# Patient Record
Sex: MALE | Race: BLACK OR AFRICAN AMERICAN | NOT HISPANIC OR LATINO | Employment: OTHER | URBAN - METROPOLITAN AREA
[De-identification: names, ages, dates, MRNs, and addresses within clinical notes are randomized per-mention and may not be internally consistent; named-entity substitution may affect disease eponyms.]

---

## 2023-10-03 DIAGNOSIS — H40.9 GLAUCOMA, UNSPECIFIED GLAUCOMA TYPE, UNSPECIFIED LATERALITY: Primary | ICD-10-CM

## 2023-10-04 RX ORDER — NETARSUDIL AND LATANOPROST OPHTHALMIC SOLUTION, 0.02%/0.005% .2; .05 MG/ML; MG/ML
SOLUTION/ DROPS OPHTHALMIC; TOPICAL
Qty: 2.5 ML | Refills: 2 | Status: SHIPPED | OUTPATIENT
Start: 2023-10-04 | End: 2024-01-11

## 2023-11-16 ENCOUNTER — APPOINTMENT (OUTPATIENT)
Dept: SURGERY | Facility: CLINIC | Age: 78
End: 2023-11-16
Payer: MEDICARE

## 2023-11-20 ENCOUNTER — APPOINTMENT (OUTPATIENT)
Dept: PRIMARY CARE | Facility: CLINIC | Age: 78
End: 2023-11-20
Payer: MEDICARE

## 2023-11-20 ENCOUNTER — TELEPHONE (OUTPATIENT)
Dept: PRIMARY CARE | Facility: CLINIC | Age: 78
End: 2023-11-20

## 2023-11-29 ENCOUNTER — APPOINTMENT (OUTPATIENT)
Dept: SURGERY | Facility: CLINIC | Age: 78
End: 2023-11-29
Payer: MEDICARE

## 2023-12-07 ENCOUNTER — LAB (OUTPATIENT)
Dept: LAB | Facility: LAB | Age: 78
End: 2023-12-07
Payer: MEDICARE

## 2023-12-07 DIAGNOSIS — Z12.5 SCREENING FOR PROSTATE CANCER: ICD-10-CM

## 2023-12-07 DIAGNOSIS — Z12.5 SCREENING FOR PROSTATE CANCER: Primary | ICD-10-CM

## 2023-12-07 PROCEDURE — G0103 PSA SCREENING: HCPCS

## 2023-12-07 PROCEDURE — 36415 COLL VENOUS BLD VENIPUNCTURE: CPT

## 2023-12-08 LAB — PSA SERPL-MCNC: 0.51 NG/ML

## 2023-12-10 PROBLEM — C61 ADENOCARCINOMA OF PROSTATE (MULTI): Status: ACTIVE | Noted: 2023-12-10

## 2023-12-10 PROBLEM — Z90.79 STATUS POST PROSTATECTOMY: Status: ACTIVE | Noted: 2023-12-10

## 2023-12-10 PROBLEM — R32 URINARY INCONTINENCE: Status: ACTIVE | Noted: 2023-12-10

## 2023-12-10 PROBLEM — N52.9 MALE ERECTILE DISORDER OF ORGANIC ORIGIN: Status: ACTIVE | Noted: 2023-12-10

## 2023-12-10 RX ORDER — TADALAFIL 20 MG/1
TABLET ORAL
COMMUNITY
Start: 2020-04-29 | End: 2023-12-11 | Stop reason: WASHOUT

## 2023-12-10 NOTE — PROGRESS NOTES
Subjective   Patient ID: Ariel Mayorga is a 78 y.o. male presenting for FUV     HPI  Last visit with Dr. Giles 12/28/2022. History of Prostate Cancer s/p RALP by Dr. Smyth in 2011, ED, and elevating PSA. Reports healthy lifestyle, vegan, and regularly exercises.    Lab Results   Component Value Date    PSA 0.51 12/07/2023    PSA 0.39 09/26/2022    PSA 0.36 08/02/2022    PSA 0.39 04/20/2022    PSA 0.37 02/28/2022    PSA 0.37 11/16/2021    PSA 0.33 08/25/2021    PSA 0.27 04/26/2021    PSA 0.31 02/26/2021    PSA 0.23 10/23/2020         Review of Systems  All other systems have been reviewed and are negative for complaint.    Objective   Physical Exam  General: Well developed, well nourished, alert and cooperative, appears in no acute distress  Eyes: Non-injected conjunctiva, sclera clear, no proptosis  Cardiac: Extremities are warm and well perfused. No edema, cyanosis or pallor.   Lungs: Breathing is easy, non-labored. Speaking in clear and complete sentences. Normal diaphragmatic movement.  MSK: Ambulatory with steady gait, unassisted  Neuro: alert and oriented to person, place and time  Psych: Demonstrates good judgement and reason, without hallucinations, abnormal affect or abnormal behaviors.  Skin: no obvious lesions, no rashes.    Assessment/Plan   Diagnoses and all orders for this visit:  Male erectile disorder of organic origin  Urinary incontinence, unspecified type  Adenocarcinoma of prostate (CMS/HCC)  Status post prostatectomy  Elevated PSA  -     Referral to Radiation Oncology; Future      Today we discussed PSA as a tool to screen gentleman for prostate cancer. We discussed that many factors can elevate the PSA, and when the PSA is elevated further testing is warranted.We discussed the patient's PSA as well as the controversy of using PSA for screening, especially past the age of 70 years old. I explained that while PSA is used for  prostate cancer screening, it is not specific to prostate cancer and can  be elevated with benign growth of the prostate. We discussed options of proceeding with repeating MRI, proceeding with transrectal ultrasound prostate biopsy, or continuing to monitor his PSA.     Patient prefers to proceed with referral to radiation oncology. Given to patient today     Erectile dysfunction  No longer sexually active     Patient will follow up in 6 months, sooner if needed.     All questions and concerns were addressed. Patient verbalizes understanding and has no other questions at this time.   Kiersten Winn-- ESME CHERRY  Office Phone: [phone]

## 2023-12-11 ENCOUNTER — OFFICE VISIT (OUTPATIENT)
Dept: UROLOGY | Facility: HOSPITAL | Age: 78
End: 2023-12-11
Payer: MEDICARE

## 2023-12-11 DIAGNOSIS — Z90.79 STATUS POST PROSTATECTOMY: ICD-10-CM

## 2023-12-11 DIAGNOSIS — R97.20 ELEVATED PSA: ICD-10-CM

## 2023-12-11 DIAGNOSIS — C61 ADENOCARCINOMA OF PROSTATE (MULTI): ICD-10-CM

## 2023-12-11 DIAGNOSIS — N52.9 MALE ERECTILE DISORDER OF ORGANIC ORIGIN: Primary | ICD-10-CM

## 2023-12-11 DIAGNOSIS — R32 URINARY INCONTINENCE, UNSPECIFIED TYPE: ICD-10-CM

## 2023-12-11 PROCEDURE — 1126F AMNT PAIN NOTED NONE PRSNT: CPT | Performed by: NURSE PRACTITIONER

## 2023-12-11 PROCEDURE — 99213 OFFICE O/P EST LOW 20 MIN: CPT | Performed by: NURSE PRACTITIONER

## 2023-12-13 ENCOUNTER — OFFICE VISIT (OUTPATIENT)
Dept: PRIMARY CARE | Facility: CLINIC | Age: 78
End: 2023-12-13
Payer: MEDICARE

## 2023-12-13 DIAGNOSIS — M06.9 RHEUMATOID ARTHRITIS, INVOLVING UNSPECIFIED SITE, UNSPECIFIED WHETHER RHEUMATOID FACTOR PRESENT (MULTI): ICD-10-CM

## 2023-12-13 DIAGNOSIS — C61 ADENOCARCINOMA OF PROSTATE (MULTI): ICD-10-CM

## 2023-12-13 DIAGNOSIS — R03.0 BLOOD PRESSURE ELEVATED WITHOUT HISTORY OF HTN: ICD-10-CM

## 2023-12-13 DIAGNOSIS — Z00.00 HEALTH CARE MAINTENANCE: ICD-10-CM

## 2023-12-13 DIAGNOSIS — E78.5 BORDERLINE HYPERLIPIDEMIA: Primary | ICD-10-CM

## 2023-12-13 LAB
ALBUMIN SERPL BCP-MCNC: 4.6 G/DL (ref 3.4–5)
ALP SERPL-CCNC: 56 U/L (ref 33–136)
ALT SERPL W P-5'-P-CCNC: 14 U/L (ref 10–52)
ANION GAP SERPL CALC-SCNC: 12 MMOL/L (ref 10–20)
AST SERPL W P-5'-P-CCNC: 21 U/L (ref 9–39)
BILIRUB SERPL-MCNC: 0.9 MG/DL (ref 0–1.2)
BUN SERPL-MCNC: 9 MG/DL (ref 6–23)
CALCIUM SERPL-MCNC: 9.6 MG/DL (ref 8.6–10.6)
CHLORIDE SERPL-SCNC: 101 MMOL/L (ref 98–107)
CHOLEST SERPL-MCNC: 146 MG/DL (ref 0–199)
CHOLESTEROL/HDL RATIO: 2.2
CO2 SERPL-SCNC: 31 MMOL/L (ref 21–32)
CREAT SERPL-MCNC: 0.96 MG/DL (ref 0.5–1.3)
ERYTHROCYTE [DISTWIDTH] IN BLOOD BY AUTOMATED COUNT: 14.6 % (ref 11.5–14.5)
GFR SERPL CREATININE-BSD FRML MDRD: 80 ML/MIN/1.73M*2
GLUCOSE SERPL-MCNC: 79 MG/DL (ref 74–99)
HCT VFR BLD AUTO: 41.8 % (ref 41–52)
HDLC SERPL-MCNC: 66.4 MG/DL
HGB BLD-MCNC: 13.2 G/DL (ref 13.5–17.5)
LDLC SERPL CALC-MCNC: 65 MG/DL
MCH RBC QN AUTO: 26.6 PG (ref 26–34)
MCHC RBC AUTO-ENTMCNC: 31.6 G/DL (ref 32–36)
MCV RBC AUTO: 84 FL (ref 80–100)
NON HDL CHOLESTEROL: 80 MG/DL (ref 0–149)
NRBC BLD-RTO: 0 /100 WBCS (ref 0–0)
PLATELET # BLD AUTO: 201 X10*3/UL (ref 150–450)
POTASSIUM SERPL-SCNC: 4 MMOL/L (ref 3.5–5.3)
PROT SERPL-MCNC: 7 G/DL (ref 6.4–8.2)
RBC # BLD AUTO: 4.97 X10*6/UL (ref 4.5–5.9)
SODIUM SERPL-SCNC: 140 MMOL/L (ref 136–145)
TRIGL SERPL-MCNC: 74 MG/DL (ref 0–149)
VLDL: 15 MG/DL (ref 0–40)
WBC # BLD AUTO: 5.3 X10*3/UL (ref 4.4–11.3)

## 2023-12-13 PROCEDURE — 99213 OFFICE O/P EST LOW 20 MIN: CPT | Performed by: INTERNAL MEDICINE

## 2023-12-13 PROCEDURE — 1170F FXNL STATUS ASSESSED: CPT | Performed by: INTERNAL MEDICINE

## 2023-12-13 PROCEDURE — 36415 COLL VENOUS BLD VENIPUNCTURE: CPT

## 2023-12-13 PROCEDURE — 99397 PER PM REEVAL EST PAT 65+ YR: CPT | Performed by: INTERNAL MEDICINE

## 2023-12-13 PROCEDURE — 80061 LIPID PANEL: CPT

## 2023-12-13 PROCEDURE — 1036F TOBACCO NON-USER: CPT | Performed by: INTERNAL MEDICINE

## 2023-12-13 PROCEDURE — G0439 PPPS, SUBSEQ VISIT: HCPCS | Performed by: INTERNAL MEDICINE

## 2023-12-13 PROCEDURE — 80053 COMPREHEN METABOLIC PANEL: CPT

## 2023-12-13 PROCEDURE — 1160F RVW MEDS BY RX/DR IN RCRD: CPT | Performed by: INTERNAL MEDICINE

## 2023-12-13 PROCEDURE — 1126F AMNT PAIN NOTED NONE PRSNT: CPT | Performed by: INTERNAL MEDICINE

## 2023-12-13 PROCEDURE — 85027 COMPLETE CBC AUTOMATED: CPT

## 2023-12-13 PROCEDURE — 1159F MED LIST DOCD IN RCRD: CPT | Performed by: INTERNAL MEDICINE

## 2023-12-21 ENCOUNTER — APPOINTMENT (OUTPATIENT)
Dept: SURGERY | Facility: CLINIC | Age: 78
End: 2023-12-21
Payer: MEDICARE

## 2023-12-30 ASSESSMENT — ENCOUNTER SYMPTOMS
LOSS OF SENSATION IN FEET: 0
OCCASIONAL FEELINGS OF UNSTEADINESS: 0
DEPRESSION: 0

## 2023-12-30 ASSESSMENT — ACTIVITIES OF DAILY LIVING (ADL)
DOING_HOUSEWORK: INDEPENDENT
MANAGING_FINANCES: INDEPENDENT
GROCERY_SHOPPING: INDEPENDENT
TAKING_MEDICATION: INDEPENDENT
DRESSING: INDEPENDENT
BATHING: INDEPENDENT

## 2023-12-30 ASSESSMENT — PATIENT HEALTH QUESTIONNAIRE - PHQ9
1. LITTLE INTEREST OR PLEASURE IN DOING THINGS: NOT AT ALL
SUM OF ALL RESPONSES TO PHQ9 QUESTIONS 1 AND 2: 0
2. FEELING DOWN, DEPRESSED OR HOPELESS: NOT AT ALL

## 2023-12-30 NOTE — PROGRESS NOTES
Subjective   Patient ID: Ariel Mayorga is a 78 y.o. male who presents for No chief complaint on file..    HPI CPE see updated front sheet no chest pain no shortness of breath no side effect with medication has had previous follow-up with urology concerning the prostate cancer no new symptoms no bowel issues bones muscles joints mostly okay discussed with allergies discussed with skin    Past medical history prostate cancer carpal tunnel syndrome allergic rhinitis status post inguinal hernia    Medications noted and unchanged    Allergies noted and unchanged    Social history no tobacco discussed with alcohol    Family history noted and unchanged    Prevention usually walks daily for exercise some prior blood work reviewed discussed with colonoscopy discussed with vaccinations    Depression screen not depressed    Review of Systems    Objective   There were no vitals taken for this visit.    Physical Exam as noted bp 137/78 alert and oriented x 3 NCAT PERRLA EOMI nares without discharge OP benign TM normal bilateral EAC clear bilateral no AC nodes no JVD or bruit no thyromegaly chest clear to auscultation and percussion CV regular rate and rhythm S1-S2 without murmur gallop or rub abdomen soft nontender normal active bowel sounds no rebound or guarding no HSM LS spine normal to straighten curvature negative straight leg raise negative logroll negative SI joint tenderness extremities no clubbing cyanosis or edema normal distal pulses DTR 1+    Assessment/Plan impression General medical examination prostate cancer elevated blood pressure without hypertension  PlanRecent PSA levels reviewed with him along with his other issues regarding the prostate including incontinencee and check Chem-7 advised on glucose potassium and kidney function check hepatic panel advised on liver function check lipid panel advised on cholesterol profile Erectile function diet regular exercise increase water consumption continue to watch salt  and carbohydrates saturated fats in diet follow-up with urology continue to monitor blood pressure cholesterol recheck colonoscopy if having recheck in 3 months.  TT 60 cc 31

## 2024-01-11 DIAGNOSIS — H40.9 GLAUCOMA, UNSPECIFIED GLAUCOMA TYPE, UNSPECIFIED LATERALITY: ICD-10-CM

## 2024-01-11 RX ORDER — NETARSUDIL AND LATANOPROST OPHTHALMIC SOLUTION, 0.02%/0.005% .2; .05 MG/ML; MG/ML
SOLUTION/ DROPS OPHTHALMIC; TOPICAL
Qty: 2.5 ML | Refills: 2 | Status: SHIPPED | OUTPATIENT
Start: 2024-01-11 | End: 2024-02-12 | Stop reason: SDUPTHER

## 2024-01-22 ENCOUNTER — OFFICE VISIT (OUTPATIENT)
Dept: SURGERY | Facility: CLINIC | Age: 79
End: 2024-01-22
Payer: MEDICARE

## 2024-01-22 VITALS — BODY MASS INDEX: 22.7 KG/M2 | WEIGHT: 158.2 LBS

## 2024-01-22 DIAGNOSIS — K40.90 UNILATERAL INGUINAL HERNIA WITHOUT OBSTRUCTION OR GANGRENE, RECURRENCE NOT SPECIFIED: Primary | ICD-10-CM

## 2024-01-22 PROCEDURE — 1036F TOBACCO NON-USER: CPT | Performed by: SURGERY

## 2024-01-22 PROCEDURE — 99213 OFFICE O/P EST LOW 20 MIN: CPT | Performed by: SURGERY

## 2024-01-22 PROCEDURE — 99203 OFFICE O/P NEW LOW 30 MIN: CPT | Performed by: SURGERY

## 2024-01-22 PROCEDURE — 1126F AMNT PAIN NOTED NONE PRSNT: CPT | Performed by: SURGERY

## 2024-01-22 PROCEDURE — 1160F RVW MEDS BY RX/DR IN RCRD: CPT | Performed by: SURGERY

## 2024-01-22 ASSESSMENT — PAIN SCALES - GENERAL: PAINLEVEL: 0-NO PAIN

## 2024-01-22 NOTE — PROGRESS NOTES
Subjective   Patient ID: Ariel Mayorga is a 78 y.o. male who presents for Hernia.  HPI  Very pleasant 78-year-old gentleman presents for evaluation treatment of a large left inguinal hernia.  This hernia has been slow growing for at least the last 5 years.  Lately it has become more symptomatic with pain on exertion.  He was scheduled to have this repaired roughly 3 years ago but had to forego surgery secondary to the COVID epidemic.  He is ready to have it repaired.      Review of Systems  On review of systems patient denies any weight loss, fever, change in bowel habits, melena, hematochezia, coronary artery disease, hypertension, TIA/CVA, bleeding, jaundice, pancreatitis, hepatitis.    Patient does not smoke. Patient does rarely drink alcohol.    He is retired from [employer]      Past Medical History:   Diagnosis Date    Cellulitis of face 10/09/2014    Facial cellulitis    Encounter for immunization 10/16/2015    Flu vaccine need    Encounter for screening for malignant neoplasm of prostate 03/03/2015    Encounter for prostate cancer screening    Other conditions influencing health status     Reported Prostate Antigen Blood Test    Personal history of other diseases of the nervous system and sense organs     History of glaucoma    Personal history of other diseases of the nervous system and sense organs     History of cataract    Personal history of other specified conditions 03/03/2015    History of abdominal pain    Prostate cancer     Past Surgical History:   Procedure Laterality Date    COLONOSCOPY  12/03/2013    Complete Colonoscopy    OTHER SURGICAL HISTORY  07/29/2013    Prostatectomy Robotic-Assisted    PROSTATECTOMY  12/03/2013    Prostatectomy Radical        Objective     Physical Exam    Frail elderly gentleman, well-developed. In no distress  Alert and oriented x 3  Lungs are clear to auscultation bilaterally.  Cardiac exam is regular rhythm and rate.  Abdomen is soft nontender nondistended.   Supraumbilical midline incision  Neurologic exam is without focal deficit.  Large, chronically incarcerated left inguinal hernia.  It does reduce fairly easily.  No hernias noted on the right side    Assessment/Plan   Diagnoses and all orders for this visit:  Unilateral inguinal hernia without obstruction or gangrene, recurrence not specified  -     Case Request Operating Room: Repair Inguinal Hernia Robot-Assisted; Standing      Impression:    Large, chronically incarcerated left inguinal hernia.  Recommend robotic left inguinal hernia repair with mesh.  We discussed the procedure to include risk benefits and alternatives.  He agrees to the operation which is tentatively scheduled for next month

## 2024-01-22 NOTE — LETTER
January 22, 2024     Alireza Gudino MD  [address]  [facility], [facility] [address]  [facility] [address]    Patient: Ariel Mayorga   YOB: 1945   Date of Visit: 1/22/2024       Dear Dr. Alireza Gudino MD:    Thank you for referring Ariel Mayorga to me for evaluation. Below are my notes for this consultation.  If you have questions, please do not hesitate to call me. I look forward to following your patient along with you.       Sincerely,     Arsen Dawn MD      CC: No Recipients  ______________________________________________________________________________________    Subjective  Patient ID: Ariel Mayorga is a 78 y.o. male who presents for Hernia.  HPI  Very pleasant 78-year-old gentleman presents for evaluation treatment of a large left inguinal hernia.  This hernia has been slow growing for at least the last 5 years.  Lately it has become more symptomatic with pain on exertion.  He was scheduled to have this repaired roughly 3 years ago but had to forego surgery secondary to the COVID epidemic.  He is ready to have it repaired.      Review of Systems  On review of systems patient denies any weight loss, fever, change in bowel habits, melena, hematochezia, coronary artery disease, hypertension, TIA/CVA, bleeding, jaundice, pancreatitis, hepatitis.    Patient does not smoke. Patient does rarely drink alcohol.    He is retired from [employer]      Past Medical History:   Diagnosis Date   • Cellulitis of face 10/09/2014    Facial cellulitis   • Encounter for immunization 10/16/2015    Flu vaccine need   • Encounter for screening for malignant neoplasm of prostate 03/03/2015    Encounter for prostate cancer screening   • Other conditions influencing health status     Reported Prostate Antigen Blood Test   • Personal history of other diseases of the nervous system and sense organs     History of glaucoma   • Personal history of other diseases of the nervous system and sense organs      History of cataract   • Personal history of other specified conditions 03/03/2015    History of abdominal pain    Prostate cancer     Past Surgical History:   Procedure Laterality Date   • COLONOSCOPY  12/03/2013    Complete Colonoscopy   • OTHER SURGICAL HISTORY  07/29/2013    Prostatectomy Robotic-Assisted   • PROSTATECTOMY  12/03/2013    Prostatectomy Radical        Objective    Physical Exam    Frail elderly gentleman, well-developed. In no distress  Alert and oriented x 3  Lungs are clear to auscultation bilaterally.  Cardiac exam is regular rhythm and rate.  Abdomen is soft nontender nondistended.  Supraumbilical midline incision  Neurologic exam is without focal deficit.  Large, chronically incarcerated left inguinal hernia.  It does reduce fairly easily.  No hernias noted on the right side    Assessment/Plan  Diagnoses and all orders for this visit:  Unilateral inguinal hernia without obstruction or gangrene, recurrence not specified  -     Case Request Operating Room: Repair Inguinal Hernia Robot-Assisted; Standing      Impression:    Large, chronically incarcerated left inguinal hernia.  Recommend robotic left inguinal hernia repair with mesh.  We discussed the procedure to include risk benefits and alternatives.  He agrees to the operation which is tentatively scheduled for next month

## 2024-01-23 ENCOUNTER — HOSPITAL ENCOUNTER (OUTPATIENT)
Facility: HOSPITAL | Age: 79
Setting detail: OUTPATIENT SURGERY
End: 2024-01-23
Attending: SURGERY | Admitting: SURGERY
Payer: MEDICARE

## 2024-01-23 PROBLEM — K40.90 UNILATERAL INGUINAL HERNIA WITHOUT OBSTRUCTION OR GANGRENE: Status: ACTIVE | Noted: 2024-01-22

## 2024-02-08 ENCOUNTER — TELEPHONE (OUTPATIENT)
Dept: SURGERY | Facility: CLINIC | Age: 79
End: 2024-02-08
Payer: MEDICARE

## 2024-02-08 NOTE — TELEPHONE ENCOUNTER
Patient returning call to Leslie in reference to surgery date next Friday, February 16, 2024. That is not a good date. Please call me.  Phone  [phone]

## 2024-02-12 ENCOUNTER — OFFICE VISIT (OUTPATIENT)
Dept: OPHTHALMOLOGY | Facility: CLINIC | Age: 79
End: 2024-02-12
Payer: MEDICARE

## 2024-02-12 DIAGNOSIS — H35.351 CYSTOID MACULAR EDEMA OF RIGHT EYE: ICD-10-CM

## 2024-02-12 DIAGNOSIS — H25.813 COMBINED FORMS OF AGE-RELATED CATARACT OF BOTH EYES: ICD-10-CM

## 2024-02-12 DIAGNOSIS — H40.2231 CHRONIC ANGLE-CLOSURE GLAUCOMA OF BOTH EYES, MILD STAGE: Primary | ICD-10-CM

## 2024-02-12 DIAGNOSIS — H40.9 GLAUCOMA, UNSPECIFIED GLAUCOMA TYPE, UNSPECIFIED LATERALITY: ICD-10-CM

## 2024-02-12 PROCEDURE — 99214 OFFICE O/P EST MOD 30 MIN: CPT | Performed by: OPHTHALMOLOGY

## 2024-02-12 PROCEDURE — 92134 CPTRZ OPH DX IMG PST SGM RTA: CPT | Mod: BILATERAL PROCEDURE | Performed by: OPHTHALMOLOGY

## 2024-02-12 PROCEDURE — 92136 OPHTHALMIC BIOMETRY: CPT | Performed by: OPHTHALMOLOGY

## 2024-02-12 PROCEDURE — 92136 OPHTHALMIC BIOMETRY: CPT | Mod: BILATERAL PROCEDURE | Performed by: OPHTHALMOLOGY

## 2024-02-12 RX ORDER — MOXIFLOXACIN 5 MG/ML
1 SOLUTION/ DROPS OPHTHALMIC
Status: CANCELLED | OUTPATIENT
Start: 2024-02-12 | End: 2024-02-12

## 2024-02-12 RX ORDER — CYCLOPENTOLATE HYDROCHLORIDE 10 MG/ML
1 SOLUTION/ DROPS OPHTHALMIC
Status: CANCELLED | OUTPATIENT
Start: 2024-02-12 | End: 2024-02-12

## 2024-02-12 RX ORDER — TROPICAMIDE 10 MG/ML
1 SOLUTION/ DROPS OPHTHALMIC
Status: CANCELLED | OUTPATIENT
Start: 2024-02-12 | End: 2024-02-12

## 2024-02-12 RX ORDER — DORZOLAMIDE HYDROCHLORIDE AND TIMOLOL MALEATE 20; 5 MG/ML; MG/ML
1 SOLUTION/ DROPS OPHTHALMIC 2 TIMES DAILY
Qty: 30 ML | Refills: 3 | Status: SHIPPED | OUTPATIENT
Start: 2024-02-12 | End: 2025-02-11

## 2024-02-12 RX ORDER — PHENYLEPHRINE HYDROCHLORIDE 25 MG/ML
1 SOLUTION/ DROPS OPHTHALMIC
Status: CANCELLED | OUTPATIENT
Start: 2024-02-12 | End: 2024-02-12

## 2024-02-12 RX ORDER — NETARSUDIL AND LATANOPROST OPHTHALMIC SOLUTION, 0.02%/0.005% .2; .05 MG/ML; MG/ML
1 SOLUTION/ DROPS OPHTHALMIC; TOPICAL NIGHTLY
Qty: 7.5 ML | Refills: 3 | Status: SHIPPED | OUTPATIENT
Start: 2024-02-12 | End: 2025-02-11

## 2024-02-12 RX ORDER — DICLOFENAC SODIUM 1 MG/ML
1 SOLUTION/ DROPS OPHTHALMIC
Status: CANCELLED | OUTPATIENT
Start: 2024-02-12 | End: 2024-02-12

## 2024-02-12 RX ORDER — DORZOLAMIDE HYDROCHLORIDE AND TIMOLOL MALEATE 20; 5 MG/ML; MG/ML
1 SOLUTION/ DROPS OPHTHALMIC 2 TIMES DAILY
COMMUNITY
Start: 2024-01-22 | End: 2024-02-12 | Stop reason: SDUPTHER

## 2024-02-12 ASSESSMENT — ENCOUNTER SYMPTOMS
ENDOCRINE NEGATIVE: 0
HEMATOLOGIC/LYMPHATIC NEGATIVE: 0
CARDIOVASCULAR NEGATIVE: 0
ALLERGIC/IMMUNOLOGIC NEGATIVE: 0
GASTROINTESTINAL NEGATIVE: 0
NEUROLOGICAL NEGATIVE: 0
CONSTITUTIONAL NEGATIVE: 0
PSYCHIATRIC NEGATIVE: 0
RESPIRATORY NEGATIVE: 0
MUSCULOSKELETAL NEGATIVE: 0
EYES NEGATIVE: 0

## 2024-02-12 ASSESSMENT — VISUAL ACUITY
OS_PH_CC+: -3
OD_BAT_MED: 20/400
OS_PH_CC: 20/25
OS_CC: 20/40
OD_CC: 20/150
OS_BAT_MED: 20/60
CORRECTION_TYPE: GLASSES
METHOD: SNELLEN - LINEAR

## 2024-02-12 ASSESSMENT — CONF VISUAL FIELD
OS_SUPERIOR_NASAL_RESTRICTION: 1
OS_INFERIOR_NASAL_RESTRICTION: 1
OD_INFERIOR_NASAL_RESTRICTION: 1
OD_SUPERIOR_NASAL_RESTRICTION: 1
OD_INFERIOR_TEMPORAL_RESTRICTION: 1
OD_SUPERIOR_TEMPORAL_RESTRICTION: 1

## 2024-02-12 ASSESSMENT — SLIT LAMP EXAM - LIDS
COMMENTS: GOOD POSITION
COMMENTS: GOOD POSITION

## 2024-02-12 ASSESSMENT — PACHYMETRY
OD_CT(UM): 501
OS_CT(UM): 502

## 2024-02-12 ASSESSMENT — TONOMETRY
OD_IOP_MMHG: 21
OS_IOP_MMHG: 23
IOP_METHOD: GOLDMANN APPLANATION

## 2024-02-12 ASSESSMENT — EXTERNAL EXAM - LEFT EYE: OS_EXAM: NORMAL

## 2024-02-12 ASSESSMENT — CUP TO DISC RATIO
OD_RATIO: 0.9
OS_RATIO: 0.9

## 2024-02-12 ASSESSMENT — EXTERNAL EXAM - RIGHT EYE: OD_EXAM: NORMAL

## 2024-02-14 NOTE — PROGRESS NOTES
Subjective   Patient ID: Ariel Mayorga is a 78 y.o. male.    AVOID DILATION, NARROW ANGLES    Current Outpatient Medications (Ophthalmology pharm classes)   Medication Sig Dispense Refill    dorzolamide-timoloL (Cosopt) 22.3-6.8 mg/mL ophthalmic solution Administer 1 drop into both eyes 2 times a day. 30 mL 3    netarsudiL-latanoprost (Rocklatan) 0.02-0.005 % drops Administer 1 drop into both eyes once daily at bedtime. 7.5 mL 3     No current outpatient medications on file. (Other)       Objective   Not recorded         Assessment/Plan   {Assess/PlanSmartLinks:06555}  #Branch retinal vein occlusion right eye with macular edema  -Previously saw Dr. Ingram 8/2022, edema noted at that time, JAYY was recommended but deferred by patient pending second opinion  -Today, *** on OCT    #chronic angle closure glaucoma, both eyes:  -Managed by Dr. Richardson, currently on rocklatan qhs and cosopt BID both eyes, and planned for phaco/migs with Dr. Richardson

## 2024-02-15 ENCOUNTER — APPOINTMENT (OUTPATIENT)
Dept: OPHTHALMOLOGY | Facility: CLINIC | Age: 79
End: 2024-02-15
Payer: MEDICARE

## 2024-02-21 NOTE — PROGRESS NOTES
Subjective   Patient ID: Ariel Mayorga is a 78 y.o. male.    AVOID DILATION, NARROW ANGLES    Current Outpatient Medications (Ophthalmology pharm classes)   Medication Sig Dispense Refill    dorzolamide-timoloL (Cosopt) 22.3-6.8 mg/mL ophthalmic solution Administer 1 drop into both eyes 2 times a day. 30 mL 3    netarsudiL-latanoprost (Rocklatan) 0.02-0.005 % drops Administer 1 drop into both eyes once daily at bedtime. 7.5 mL 3     No current outpatient medications on file. (Other)       Objective   Not recorded         Assessment/Plan   {Assess/PlanSmartLinks:04368}  #Branch retinal vein occlusion right eye with macular edema  -Previously saw Dr. Ingram 8/2022, edema noted at that time, JAYY was recommended but deferred by patient pending second opinion  -Today, *** on OCT    #chronic angle closure glaucoma, both eyes:  -Managed by Dr. Richardson, currently on rocklatan qhs and cosopt BID both eyes, and planned for phaco/migs with Dr. Richardson

## 2024-02-22 ENCOUNTER — APPOINTMENT (OUTPATIENT)
Dept: OPHTHALMOLOGY | Facility: CLINIC | Age: 79
End: 2024-02-22
Payer: MEDICARE

## 2024-03-21 ENCOUNTER — TELEPHONE (OUTPATIENT)
Dept: SURGERY | Facility: CLINIC | Age: 79
End: 2024-03-21
Payer: MEDICARE

## 2024-03-21 NOTE — TELEPHONE ENCOUNTER
Patient left message stating that his insurance will not cover Robotic robbie. What is the plan now? Please call [phone].

## 2024-03-25 ENCOUNTER — TELEPHONE (OUTPATIENT)
Dept: SURGERY | Facility: CLINIC | Age: 79
End: 2024-03-25
Payer: MEDICARE

## 2024-03-25 NOTE — TELEPHONE ENCOUNTER
Spoke with maurilio, his insurance will not cover robot so he is having a ppt with Dr Benjamin on the 4 th of April. I told him how sorry we were but he did not to have a bill with his insurance.I can understand.  Leslie

## 2024-03-25 NOTE — TELEPHONE ENCOUNTER
----- Message from Makenna Taylor MA sent at 3/21/2024  3:24 PM EDT -----  Regarding: Surgery/ Insurance  Patient called requesting a call back. His insurance will not cover Robotic . What is his next step? What's the plan?  Please call [phone].

## 2024-04-04 ENCOUNTER — OFFICE VISIT (OUTPATIENT)
Dept: SURGERY | Facility: CLINIC | Age: 79
End: 2024-04-04
Payer: MEDICARE

## 2024-04-04 VITALS
WEIGHT: 159.2 LBS | SYSTOLIC BLOOD PRESSURE: 127 MMHG | DIASTOLIC BLOOD PRESSURE: 75 MMHG | HEART RATE: 91 BPM | BODY MASS INDEX: 22.84 KG/M2 | TEMPERATURE: 96.6 F

## 2024-04-04 DIAGNOSIS — K40.90 LEFT INGUINAL HERNIA: Primary | ICD-10-CM

## 2024-04-04 DIAGNOSIS — K43.2 INCISIONAL HERNIA, WITHOUT OBSTRUCTION OR GANGRENE: ICD-10-CM

## 2024-04-04 PROCEDURE — 1160F RVW MEDS BY RX/DR IN RCRD: CPT | Performed by: SURGERY

## 2024-04-04 PROCEDURE — 1159F MED LIST DOCD IN RCRD: CPT | Performed by: SURGERY

## 2024-04-04 PROCEDURE — 99213 OFFICE O/P EST LOW 20 MIN: CPT | Performed by: SURGERY

## 2024-04-04 PROCEDURE — 1036F TOBACCO NON-USER: CPT | Performed by: SURGERY

## 2024-04-04 PROCEDURE — 1126F AMNT PAIN NOTED NONE PRSNT: CPT | Performed by: SURGERY

## 2024-04-04 RX ORDER — VIT C/E/ZN/COPPR/LUTEIN/ZEAXAN 250MG-90MG
50 CAPSULE ORAL DAILY
COMMUNITY

## 2024-04-04 ASSESSMENT — PAIN SCALES - GENERAL: PAINLEVEL: 0-NO PAIN

## 2024-04-04 NOTE — H&P (VIEW-ONLY)
History Of Present Illness  Ariel Mayorga is a 78 y.o. male presenting with left inguinal hernia along with small incisional hernia.  I previously seen him 3 years ago for this.  He had delayed the surgery because he required multiple eye surgeries.  He has now been stable.  He is having increasing problems with this left inguinal hernia.        Last Recorded Vitals  Blood pressure 127/75, pulse 91, temperature 35.9 °C (96.6 °F), weight 72.2 kg (159 lb 3.2 oz).  Physical Examination  Awake and alert.  Normal respiration.  Abdominal exam he does have a small incisional hernia at the site of his laparoscopic prostatectomy.  He does have a large left inguinal hernia that is definitely larger than 3 years ago.      Relevant Results recent laboratory values are normal.      Assessment/Plan   Patient with an incisional port site hernia along with a left inguinal hernia.  I discussed repairing these both at the same time.  I used the port site hernia at the placement of laparoscopic instruments.  Will then fix his left inguinal hernia.  He agrees with this plan.  Will schedule this at his convenience when his daughter is in town.  Jonh Benjamin MD FACS  Professor of Surgery  Romina Clifford Chair in Surgical [place]  [facility] [employer]  [address] [place] [address], [address]  Phone [phone]  email: [email or web address]

## 2024-04-04 NOTE — PROGRESS NOTES
History Of Present Illness  Ariel Mayorga is a 78 y.o. male presenting with left inguinal hernia along with small incisional hernia.  I previously seen him 3 years ago for this.  He had delayed the surgery because he required multiple eye surgeries.  He has now been stable.  He is having increasing problems with this left inguinal hernia.        Last Recorded Vitals  Blood pressure 127/75, pulse 91, temperature 35.9 °C (96.6 °F), weight 72.2 kg (159 lb 3.2 oz).  Physical Examination  Awake and alert.  Normal respiration.  Abdominal exam he does have a small incisional hernia at the site of his laparoscopic prostatectomy.  He does have a large left inguinal hernia that is definitely larger than 3 years ago.      Relevant Results recent laboratory values are normal.      Assessment/Plan   Patient with an incisional port site hernia along with a left inguinal hernia.  I discussed repairing these both at the same time.  I used the port site hernia at the placement of laparoscopic instruments.  Will then fix his left inguinal hernia.  He agrees with this plan.  Will schedule this at his convenience when his daughter is in town.  Jonh Benjamin MD FACS  Professor of Surgery  Romina Clifford Chair in Surgical [place]  [facility] [employer] [facility]  [address] [place] [address], [address]  Phone [phone]  email: [email or web address]

## 2024-05-01 ENCOUNTER — ANESTHESIA EVENT (OUTPATIENT)
Dept: OPERATING ROOM | Facility: HOSPITAL | Age: 79
End: 2024-05-01
Payer: MEDICARE

## 2024-05-01 ENCOUNTER — HOSPITAL ENCOUNTER (OUTPATIENT)
Facility: HOSPITAL | Age: 79
Setting detail: OUTPATIENT SURGERY
Discharge: HOME | End: 2024-05-01
Attending: SURGERY | Admitting: SURGERY
Payer: MEDICARE

## 2024-05-01 ENCOUNTER — ANESTHESIA (OUTPATIENT)
Dept: OPERATING ROOM | Facility: HOSPITAL | Age: 79
End: 2024-05-01
Payer: MEDICARE

## 2024-05-01 VITALS
WEIGHT: 159.39 LBS | SYSTOLIC BLOOD PRESSURE: 136 MMHG | OXYGEN SATURATION: 93 % | TEMPERATURE: 97.3 F | HEART RATE: 76 BPM | BODY MASS INDEX: 23.61 KG/M2 | DIASTOLIC BLOOD PRESSURE: 75 MMHG | HEIGHT: 69 IN | RESPIRATION RATE: 10 BRPM

## 2024-05-01 DIAGNOSIS — K40.90 LEFT INGUINAL HERNIA: Primary | ICD-10-CM

## 2024-05-01 DIAGNOSIS — K43.2 INCISIONAL HERNIA, WITHOUT OBSTRUCTION OR GANGRENE: ICD-10-CM

## 2024-05-01 PROBLEM — H40.9 GLAUCOMA: Status: ACTIVE | Noted: 2024-05-01

## 2024-05-01 PROCEDURE — 7100000001 HC RECOVERY ROOM TIME - INITIAL BASE CHARGE: Performed by: SURGERY

## 2024-05-01 PROCEDURE — 2780000003 HC OR 278 NO HCPCS: Performed by: SURGERY

## 2024-05-01 PROCEDURE — 2500000004 HC RX 250 GENERAL PHARMACY W/ HCPCS (ALT 636 FOR OP/ED): Performed by: SURGERY

## 2024-05-01 PROCEDURE — 3700000001 HC GENERAL ANESTHESIA TIME - INITIAL BASE CHARGE: Performed by: SURGERY

## 2024-05-01 PROCEDURE — C1781 MESH (IMPLANTABLE): HCPCS | Performed by: SURGERY

## 2024-05-01 PROCEDURE — 99100 ANES PT EXTEME AGE<1 YR&>70: CPT | Performed by: ANESTHESIOLOGY

## 2024-05-01 PROCEDURE — 2500000005 HC RX 250 GENERAL PHARMACY W/O HCPCS: Performed by: STUDENT IN AN ORGANIZED HEALTH CARE EDUCATION/TRAINING PROGRAM

## 2024-05-01 PROCEDURE — 49591 RPR AA HRN 1ST < 3 CM RDC: CPT | Performed by: SURGERY

## 2024-05-01 PROCEDURE — 7100000009 HC PHASE TWO TIME - INITIAL BASE CHARGE: Performed by: SURGERY

## 2024-05-01 PROCEDURE — 3700000002 HC GENERAL ANESTHESIA TIME - EACH INCREMENTAL 1 MINUTE: Performed by: SURGERY

## 2024-05-01 PROCEDURE — 2500000004 HC RX 250 GENERAL PHARMACY W/ HCPCS (ALT 636 FOR OP/ED): Performed by: STUDENT IN AN ORGANIZED HEALTH CARE EDUCATION/TRAINING PROGRAM

## 2024-05-01 PROCEDURE — 3600000008 HC OR TIME - EACH INCREMENTAL 1 MINUTE - PROCEDURE LEVEL THREE: Performed by: SURGERY

## 2024-05-01 PROCEDURE — 49650 LAP ING HERNIA REPAIR INIT: CPT | Performed by: SURGERY

## 2024-05-01 PROCEDURE — 2500000004 HC RX 250 GENERAL PHARMACY W/ HCPCS (ALT 636 FOR OP/ED): Performed by: ANESTHESIOLOGY

## 2024-05-01 PROCEDURE — 3600000003 HC OR TIME - INITIAL BASE CHARGE - PROCEDURE LEVEL THREE: Performed by: SURGERY

## 2024-05-01 PROCEDURE — A4217 STERILE WATER/SALINE, 500 ML: HCPCS | Performed by: SURGERY

## 2024-05-01 PROCEDURE — A49650 PR LAP,INGUINAL HERNIA REPR,INITIAL: Performed by: ANESTHESIOLOGY

## 2024-05-01 PROCEDURE — 7100000010 HC PHASE TWO TIME - EACH INCREMENTAL 1 MINUTE: Performed by: SURGERY

## 2024-05-01 PROCEDURE — 7100000002 HC RECOVERY ROOM TIME - EACH INCREMENTAL 1 MINUTE: Performed by: SURGERY

## 2024-05-01 PROCEDURE — 2500000005 HC RX 250 GENERAL PHARMACY W/O HCPCS: Performed by: SURGERY

## 2024-05-01 PROCEDURE — 2720000007 HC OR 272 NO HCPCS: Performed by: SURGERY

## 2024-05-01 DEVICE — BARD MESH
Type: IMPLANTABLE DEVICE | Site: ABDOMEN | Status: FUNCTIONAL
Brand: BARD MESH

## 2024-05-01 RX ORDER — LIDOCAINE HYDROCHLORIDE 20 MG/ML
INJECTION, SOLUTION INFILTRATION; PERINEURAL AS NEEDED
Status: DISCONTINUED | OUTPATIENT
Start: 2024-05-01 | End: 2024-05-01

## 2024-05-01 RX ORDER — CEFAZOLIN 1 G/1
INJECTION, POWDER, FOR SOLUTION INTRAVENOUS AS NEEDED
Status: DISCONTINUED | OUTPATIENT
Start: 2024-05-01 | End: 2024-05-01

## 2024-05-01 RX ORDER — HYDROMORPHONE HYDROCHLORIDE 1 MG/ML
0.2 INJECTION, SOLUTION INTRAMUSCULAR; INTRAVENOUS; SUBCUTANEOUS EVERY 5 MIN PRN
Status: DISCONTINUED | OUTPATIENT
Start: 2024-05-01 | End: 2024-05-01 | Stop reason: HOSPADM

## 2024-05-01 RX ORDER — ACETAMINOPHEN 325 MG/1
325 TABLET ORAL EVERY 4 HOURS PRN
Status: DISCONTINUED | OUTPATIENT
Start: 2024-05-01 | End: 2024-05-01 | Stop reason: HOSPADM

## 2024-05-01 RX ORDER — SODIUM CHLORIDE, SODIUM LACTATE, POTASSIUM CHLORIDE, CALCIUM CHLORIDE 600; 310; 30; 20 MG/100ML; MG/100ML; MG/100ML; MG/100ML
INJECTION, SOLUTION INTRAVENOUS CONTINUOUS PRN
Status: DISCONTINUED | OUTPATIENT
Start: 2024-05-01 | End: 2024-05-01

## 2024-05-01 RX ORDER — HYDROMORPHONE HYDROCHLORIDE 1 MG/ML
0.1 INJECTION, SOLUTION INTRAMUSCULAR; INTRAVENOUS; SUBCUTANEOUS EVERY 5 MIN PRN
Status: DISCONTINUED | OUTPATIENT
Start: 2024-05-01 | End: 2024-05-01 | Stop reason: HOSPADM

## 2024-05-01 RX ORDER — PROPOFOL 10 MG/ML
INJECTION, EMULSION INTRAVENOUS AS NEEDED
Status: DISCONTINUED | OUTPATIENT
Start: 2024-05-01 | End: 2024-05-01

## 2024-05-01 RX ORDER — ONDANSETRON HYDROCHLORIDE 2 MG/ML
INJECTION, SOLUTION INTRAVENOUS AS NEEDED
Status: DISCONTINUED | OUTPATIENT
Start: 2024-05-01 | End: 2024-05-01

## 2024-05-01 RX ORDER — SODIUM CHLORIDE 0.9 G/100ML
IRRIGANT IRRIGATION AS NEEDED
Status: DISCONTINUED | OUTPATIENT
Start: 2024-05-01 | End: 2024-05-01 | Stop reason: HOSPADM

## 2024-05-01 RX ORDER — BISMUTH SUBSALICYLATE 262 MG
1 TABLET,CHEWABLE ORAL DAILY
COMMUNITY

## 2024-05-01 RX ORDER — FENTANYL CITRATE 50 UG/ML
INJECTION, SOLUTION INTRAMUSCULAR; INTRAVENOUS AS NEEDED
Status: DISCONTINUED | OUTPATIENT
Start: 2024-05-01 | End: 2024-05-01

## 2024-05-01 RX ORDER — ROCURONIUM BROMIDE 10 MG/ML
INJECTION, SOLUTION INTRAVENOUS AS NEEDED
Status: DISCONTINUED | OUTPATIENT
Start: 2024-05-01 | End: 2024-05-01

## 2024-05-01 RX ORDER — SODIUM CHLORIDE, SODIUM LACTATE, POTASSIUM CHLORIDE, CALCIUM CHLORIDE 600; 310; 30; 20 MG/100ML; MG/100ML; MG/100ML; MG/100ML
50 INJECTION, SOLUTION INTRAVENOUS CONTINUOUS
Status: DISCONTINUED | OUTPATIENT
Start: 2024-05-01 | End: 2024-05-01 | Stop reason: HOSPADM

## 2024-05-01 RX ORDER — OXYCODONE HYDROCHLORIDE 5 MG/1
5 TABLET ORAL EVERY 6 HOURS PRN
Qty: 8 TABLET | Refills: 0 | Status: SHIPPED | OUTPATIENT
Start: 2024-05-01 | End: 2024-05-08

## 2024-05-01 RX ORDER — BUPIVACAINE HCL/EPINEPHRINE 0.5-1:200K
VIAL (ML) INJECTION AS NEEDED
Status: DISCONTINUED | OUTPATIENT
Start: 2024-05-01 | End: 2024-05-01 | Stop reason: HOSPADM

## 2024-05-01 RX ORDER — OXYCODONE HYDROCHLORIDE 5 MG/1
2.5 TABLET ORAL EVERY 4 HOURS PRN
Status: DISCONTINUED | OUTPATIENT
Start: 2024-05-01 | End: 2024-05-01 | Stop reason: HOSPADM

## 2024-05-01 RX ORDER — LIDOCAINE HYDROCHLORIDE 10 MG/ML
INJECTION INFILTRATION; PERINEURAL AS NEEDED
Status: DISCONTINUED | OUTPATIENT
Start: 2024-05-01 | End: 2024-05-01 | Stop reason: HOSPADM

## 2024-05-01 RX ORDER — LABETALOL HYDROCHLORIDE 5 MG/ML
5 INJECTION, SOLUTION INTRAVENOUS ONCE AS NEEDED
Status: DISCONTINUED | OUTPATIENT
Start: 2024-05-01 | End: 2024-05-01 | Stop reason: HOSPADM

## 2024-05-01 RX ORDER — ALBUTEROL SULFATE 0.83 MG/ML
2.5 SOLUTION RESPIRATORY (INHALATION) ONCE AS NEEDED
Status: DISCONTINUED | OUTPATIENT
Start: 2024-05-01 | End: 2024-05-01 | Stop reason: HOSPADM

## 2024-05-01 RX ORDER — ONDANSETRON HYDROCHLORIDE 2 MG/ML
4 INJECTION, SOLUTION INTRAVENOUS ONCE AS NEEDED
Status: DISCONTINUED | OUTPATIENT
Start: 2024-05-01 | End: 2024-05-01 | Stop reason: HOSPADM

## 2024-05-01 RX ADMIN — SUGAMMADEX 200 MG: 100 INJECTION, SOLUTION INTRAVENOUS at 11:08

## 2024-05-01 RX ADMIN — ONDANSETRON 4 MG: 2 INJECTION, SOLUTION INTRAMUSCULAR; INTRAVENOUS at 09:51

## 2024-05-01 RX ADMIN — PROPOFOL 100 MG: 10 INJECTION, EMULSION INTRAVENOUS at 09:51

## 2024-05-01 RX ADMIN — FENTANYL CITRATE 25 MCG: 50 INJECTION, SOLUTION INTRAMUSCULAR; INTRAVENOUS at 11:00

## 2024-05-01 RX ADMIN — PROPOFOL 20 MG: 10 INJECTION, EMULSION INTRAVENOUS at 09:58

## 2024-05-01 RX ADMIN — FENTANYL CITRATE 25 MCG: 50 INJECTION, SOLUTION INTRAMUSCULAR; INTRAVENOUS at 09:51

## 2024-05-01 RX ADMIN — HYDROMORPHONE HYDROCHLORIDE 0.2 MG: 1 INJECTION, SOLUTION INTRAMUSCULAR; INTRAVENOUS; SUBCUTANEOUS at 11:36

## 2024-05-01 RX ADMIN — FENTANYL CITRATE 25 MCG: 50 INJECTION, SOLUTION INTRAMUSCULAR; INTRAVENOUS at 09:54

## 2024-05-01 RX ADMIN — LIDOCAINE HYDROCHLORIDE 60 MG: 20 INJECTION, SOLUTION INFILTRATION; PERINEURAL at 09:51

## 2024-05-01 RX ADMIN — HYDROMORPHONE HYDROCHLORIDE 0.2 MG: 1 INJECTION, SOLUTION INTRAMUSCULAR; INTRAVENOUS; SUBCUTANEOUS at 11:49

## 2024-05-01 RX ADMIN — HYDROMORPHONE HYDROCHLORIDE 0.2 MG: 1 INJECTION, SOLUTION INTRAMUSCULAR; INTRAVENOUS; SUBCUTANEOUS at 11:24

## 2024-05-01 RX ADMIN — SODIUM CHLORIDE, POTASSIUM CHLORIDE, SODIUM LACTATE AND CALCIUM CHLORIDE: 600; 310; 30; 20 INJECTION, SOLUTION INTRAVENOUS at 09:51

## 2024-05-01 RX ADMIN — DEXAMETHASONE SODIUM PHOSPHATE 4 MG: 4 INJECTION INTRA-ARTICULAR; INTRALESIONAL; INTRAMUSCULAR; INTRAVENOUS; SOFT TISSUE at 09:51

## 2024-05-01 RX ADMIN — PROPOFOL 30 MG: 10 INJECTION, EMULSION INTRAVENOUS at 09:52

## 2024-05-01 RX ADMIN — FENTANYL CITRATE 25 MCG: 50 INJECTION, SOLUTION INTRAMUSCULAR; INTRAVENOUS at 10:38

## 2024-05-01 RX ADMIN — PROPOFOL 50 MG: 10 INJECTION, EMULSION INTRAVENOUS at 10:37

## 2024-05-01 RX ADMIN — ROCURONIUM 40 MG: 50 INJECTION, SOLUTION INTRAVENOUS at 09:52

## 2024-05-01 RX ADMIN — CEFAZOLIN 2 G: 1 INJECTION, POWDER, FOR SOLUTION INTRAMUSCULAR; INTRAVENOUS at 09:52

## 2024-05-01 RX ADMIN — HYDROMORPHONE HYDROCHLORIDE 0.2 MG: 1 INJECTION, SOLUTION INTRAMUSCULAR; INTRAVENOUS; SUBCUTANEOUS at 11:41

## 2024-05-01 RX ADMIN — HYDROMORPHONE HYDROCHLORIDE 0.2 MG: 1 INJECTION, SOLUTION INTRAMUSCULAR; INTRAVENOUS; SUBCUTANEOUS at 11:28

## 2024-05-01 SDOH — HEALTH STABILITY: MENTAL HEALTH: CURRENT SMOKER: 0

## 2024-05-01 ASSESSMENT — PAIN DESCRIPTION - DESCRIPTORS
DESCRIPTORS: CRAMPING
DESCRIPTORS: CRAMPING

## 2024-05-01 ASSESSMENT — PAIN - FUNCTIONAL ASSESSMENT
PAIN_FUNCTIONAL_ASSESSMENT: 0-10

## 2024-05-01 ASSESSMENT — PAIN SCALES - GENERAL
PAINLEVEL_OUTOF10: 7
PAINLEVEL_OUTOF10: 0 - NO PAIN
PAINLEVEL_OUTOF10: 6

## 2024-05-01 ASSESSMENT — COLUMBIA-SUICIDE SEVERITY RATING SCALE - C-SSRS
6. HAVE YOU EVER DONE ANYTHING, STARTED TO DO ANYTHING, OR PREPARED TO DO ANYTHING TO END YOUR LIFE?: NO
1. IN THE PAST MONTH, HAVE YOU WISHED YOU WERE DEAD OR WISHED YOU COULD GO TO SLEEP AND NOT WAKE UP?: NO
2. HAVE YOU ACTUALLY HAD ANY THOUGHTS OF KILLING YOURSELF?: NO

## 2024-05-01 NOTE — ANESTHESIA PREPROCEDURE EVALUATION
Patient: Ariel Mayorga    Procedure Information       Date/Time: 05/01/24 0915    Procedures:       Repair Inguinal Hernia Laparoscopy (Left)      Repair Ventral Hernia    Location: [facility] OR 01 / [facility] OR    Surgeons: Jonh Benjamin MD            Relevant Problems   Anesthesia (within normal limits)      Cardiac (within normal limits)  Functional capacity >4 METS      Pulmonary (within normal limits)      Neuro (within normal limits)      GI  Inguinal hernia and ventral hernia      /Renal   (+) Adenocarcinoma of prostate (Multi)      Liver (within normal limits)      Endocrine (within normal limits)      Hematology (within normal limits)      Musculoskeletal   (+) Rheumatoid arthritis (Multi)      HEENT   (+) Glaucoma     Vitals:    05/01/24 0830   BP: 151/84   Pulse: 81   Resp: 16   Temp: 36 °C (96.8 °F)   SpO2: 99%       Past Surgical History:   Procedure Laterality Date    COLONOSCOPY  12/03/2013    Complete Colonoscopy    OTHER SURGICAL HISTORY  07/29/2013    Prostatectomy Robotic-Assisted    PROSTATECTOMY  12/03/2013    Prostatectomy Radical     Past Medical History:   Diagnosis Date    Cellulitis of face 10/09/2014    Facial cellulitis    Encounter for immunization 10/16/2015    Flu vaccine need    Encounter for screening for malignant neoplasm of prostate 03/03/2015    Encounter for prostate cancer screening    Other conditions influencing health status     Reported Prostate Antigen Blood Test    Personal history of other diseases of the nervous system and sense organs     History of glaucoma    Personal history of other diseases of the nervous system and sense organs     History of cataract    Personal history of other specified conditions 03/03/2015    History of abdominal pain     No current facility-administered medications for this encounter.  Prior to Admission medications    Medication Sig Start Date End Date Taking? Authorizing Provider   cholecalciferol (Vitamin D-3) 25 MCG (1000  UT) capsule Take 2 capsules (50 mcg) by mouth once daily.   Yes Historical Provider, MD   dorzolamide-timoloL (Cosopt) 22.3-6.8 mg/mL ophthalmic solution Administer 1 drop into both eyes 2 times a day. 2/12/24 2/11/25 Yes Jacqueline Richardson MD   multivitamin tablet Take 1 tablet by mouth once daily.   Yes Historical Provider, MD   netarsudiL-latanoprost (Rocklatan) 0.02-0.005 % drops Administer 1 drop into both eyes once daily at bedtime. 2/12/24 2/11/25 Yes Jacqueline Richardson MD     No Known Allergies  Social History     Tobacco Use    Smoking status: Never    Smokeless tobacco: Never   Substance Use Topics    Alcohol use: Yes     Comment: socially         Chemistry    Lab Results   Component Value Date/Time     12/13/2023 1212    K 4.0 12/13/2023 1212     12/13/2023 1212    CO2 31 12/13/2023 1212    BUN 9 12/13/2023 1212    CREATININE 0.96 12/13/2023 1212    Lab Results   Component Value Date/Time    CALCIUM 9.6 12/13/2023 1212    ALKPHOS 56 12/13/2023 1212    AST 21 12/13/2023 1212    ALT 14 12/13/2023 1212    BILITOT 0.9 12/13/2023 1212          Lab Results   Component Value Date/Time    WBC 5.3 12/13/2023 1212    HGB 13.2 (L) 12/13/2023 1212    HCT 41.8 12/13/2023 1212     12/13/2023 1212     Clinical information reviewed:   Tobacco  Allergies  Meds   Med Hx  Surg Hx   Fam Hx  Soc Hx        NPO Detail:  NPO/Void Status  Date of Last Liquid: 04/30/24  Time of Last Liquid: 2230  Date of Last Solid: 04/30/24  Time of Last Solid: 2000  Last Intake Type: Clear fluids  Time of Last Void: 0840         Physical Exam    Airway  Mallampati: III  TM distance: >3 FB  Neck ROM: full     Cardiovascular   Rhythm: regular     Dental   (+) upper dentures, lower dentures  Comments: Denies loose, broken   Pulmonary   Breath sounds clear to auscultation     Abdominal            Anesthesia Plan    History of general anesthesia?: yes  History of complications of general anesthesia?: no    ASA 2     general     The  patient is not a current smoker.    intravenous induction   Anesthetic plan and risks discussed with patient.  Use of blood products discussed with patient who consented to blood products.

## 2024-05-01 NOTE — INTERVAL H&P NOTE
H&P reviewed. The patient was examined and there are no changes to the H&P.    Awake and alert. Normal respirations.  I marked his hernia site

## 2024-05-01 NOTE — OP NOTE
Repair Inguinal Hernia Laparoscopy (L), Repair Ventral Hernia Operative Note     Date: 2024  OR Location: [facility] OR    Name: Ariel Mayorga : 1945, Age: 78 y.o., MRN: [id number], Sex: male    Diagnosis  Pre-op Diagnosis     * Left inguinal hernia [K40.90]     * Incisional hernia, without obstruction or gangrene [K43.2] Post-op Diagnosis     * Left inguinal hernia [K40.90]     * Incisional hernia, without obstruction or gangrene [K43.2]     Procedures  Repair Inguinal Hernia Laparoscopy  14735 - [place] LAPAROSCOPY SURG RPR INITIAL INGUINAL HERNIA    Repair Ventral Hernia  72971 - KS RPR AA HERNIA 1ST < 3 CM REDUCIBLE      Surgeons      * Jonh Benjamin - Primary    Resident/Fellow/Other Assistant:  Surgeons and Role:  * No surgeons found with a matching role *    Procedure Summary  Anesthesia: General  ASA: II  Anesthesia Staff: Anesthesiologist: Pasquale Keenan DO  Anesthesia Resident: Hollie Chaparro DO  Estimated Blood Loss: 5mL  Intra-op Medications:   Administrations occurring from 0915 to 1015 on 24:   Medication Name Total Dose   sodium chloride 0.9 % irrigation solution 400 mL              Anesthesia Record               Intraprocedure I/O Totals       None           Specimen: No specimens collected     Staff:   Circulator: Kinjal Wolfe RN  Relief Circulator: Dae Rodríguez RN  Scrub Person: Aayush Valverde RN         Drains and/or Catheters: * None in log *    Tourniquet Times:         Implants:  Implants       Type Name Action Serial No.      Surgical Mesh Sling Implant PATCH, MESH, MARLEX, 6 X 6 IN, POLYPROPYLENE - [id number] Implanted               Findings: Indirect left inguinal hernia    Indications: Ariel Mayorga is an 78 y.o. male who is having surgery for Left inguinal hernia [K40.90]  Incisional hernia, without obstruction or gangrene [K43.2].     The patient was seen in the preoperative area. The risks, benefits, complications, treatment options, non-operative  alternatives, expected recovery and outcomes were discussed with the patient. The possibilities of reaction to medication, pulmonary aspiration, injury to surrounding structures, bleeding, recurrent infection, the need for additional procedures, failure to diagnose a condition, and creating a complication requiring transfusion or operation were discussed with the patient. The patient concurred with the proposed plan, giving informed consent.  The site of surgery was properly noted/marked if necessary per policy. The patient has been actively warmed in preoperative area. Preoperative antibiotics have been ordered and given within 1 hours of incision. Venous thrombosis prophylaxis have been ordered including bilateral sequential compression devices    Procedure Details: Patient brought to operating room general endotracheal esthesia performed.  Patient's abdomen was prepped and draped.  Using a vertical incision over his previous robotic trocar site identified the fascial defect.  I went through the fascial defect and entered insufflated his abdomen.  This where he had his ventral hernia at this area.  Then placed 2 5 trocars out laterally.  There is no hernia on the right.  There is a large indirect inguinal hernia on the left.  I opened up the preperitoneal space reduced the hernia peritonealized the cord structures.  Medially as able to identify a plane although does limit more adhesions.  This is medial to the inferior pregastric.  Is able to identify enough room to place the mesh medially and fixated.  I then cut a piece of mesh 5-1/2 x 3-1/2 inches.  I placed in the preperitoneal space.  I fixated above the iliopubic tract.  Out laterally.  Lied very nicely over the psoas muscle.  I then fixated it medially.  I then was able to very easily close the peritoneum over the mesh.  Then remove my trocars.  I then extended his incisional site.  His total fascial defect was 2-1/2 cm.  I cleaned up the fascia.  I closed  with interrupted 0 Prolene.  He did have diastases in the upper abdomen that we have talked about preoperatively.  I did bring this together just in the my area where the defect was.  Other than that about the fascial edges from the hernia.  I then resected the hernia sac.  I closed the skin incisions with 4-0 Vicryl.  Complications:  None; patient tolerated the procedure well.    Disposition: PACU - hemodynamically stable.  Condition: stable     Attending Attestation: I was present for the entire procedure.    Jonh Benjamin  Phone Number: [phone]

## 2024-05-01 NOTE — SIGNIFICANT EVENT
Report from anesthesia and surgery at bedside; Patient does not need to void prior to discharge per Dr. Benjamin.

## 2024-05-01 NOTE — ANESTHESIA PROCEDURE NOTES
Airway  Date/Time: 5/1/2024 9:53 AM  Urgency: elective      Staffing  Performed: resident   Authorized by: Pasquale Keenan DO    Performed by: Hollie Chaparro DO  Patient location during procedure: OR    Indications and Patient Condition  Indications for airway management: anesthesia  Spontaneous ventilation: present  Sedation level: deep  Preoxygenated: yes  Patient position: sniffing  MILS maintained throughout  Mask difficulty assessment: 1 - vent by mask    Final Airway Details  Final airway type: endotracheal airway      Successful airway: ETT  Cuffed: yes   Successful intubation technique: video laryngoscopy  Facilitating devices/methods: intubating stylet  Endotracheal tube insertion site: oral  Blade: Brandon  Blade size: #4  ETT size (mm): 7.0  Cormack-Lehane Classification: grade I - full view of glottis  Placement verified by: chest auscultation and capnometry   Measured from: lips  ETT to lips (cm): 21  Number of attempts at approach: 1    Additional Comments  Video laryngoscope for education purposes by rotating resident

## 2024-05-01 NOTE — SIGNIFICANT EVENT
Discharge instructions reviewed with the patient and family members. Patient meets criteria to discharge home.

## 2024-05-01 NOTE — ANESTHESIA POSTPROCEDURE EVALUATION
Patient: Ariel Mayorga    Procedure Summary       Date: 05/01/24 Room / Location: [facility] OR 01 / [facility]    Anesthesia Start: 0943 Anesthesia Stop: 1120    Procedures:       Repair Inguinal Hernia Laparoscopy (Left)      Repair Ventral Hernia Diagnosis:       Left inguinal hernia      Incisional hernia, without obstruction or gangrene      (Left inguinal hernia [K40.90])      (Incisional hernia, without obstruction or gangrene [K43.2])    Surgeons: Jonh Benjamin MD Responsible Provider: Pasquale Keenan DO    Anesthesia Type: general ASA Status: 2            Anesthesia Type: general    Vitals Value Taken Time   /75 05/01/24 1200   Temp 36.3 °C (97.3 °F) 05/01/24 1200   Pulse 77 05/01/24 1225   Resp 12 05/01/24 1208   SpO2 96 % 05/01/24 1225   Vitals shown include unfiled device data.    Anesthesia Post Evaluation    Patient location during evaluation: PACU  Patient participation: complete - patient participated  Level of consciousness: awake and alert  Pain management: adequate  Airway patency: patent  Cardiovascular status: acceptable and blood pressure returned to baseline  Respiratory status: acceptable  Hydration status: acceptable  Postoperative Nausea and Vomiting: none        No notable events documented.

## 2024-05-02 ASSESSMENT — PAIN SCALES - GENERAL: PAINLEVEL_OUTOF10: 5 - MODERATE PAIN

## 2024-05-16 ENCOUNTER — OFFICE VISIT (OUTPATIENT)
Dept: SURGERY | Facility: CLINIC | Age: 79
End: 2024-05-16
Payer: MEDICARE

## 2024-05-16 VITALS
HEART RATE: 72 BPM | WEIGHT: 155.1 LBS | BODY MASS INDEX: 22.9 KG/M2 | TEMPERATURE: 99 F | DIASTOLIC BLOOD PRESSURE: 82 MMHG | SYSTOLIC BLOOD PRESSURE: 137 MMHG

## 2024-05-16 DIAGNOSIS — K40.90 LEFT INGUINAL HERNIA: Primary | ICD-10-CM

## 2024-05-16 DIAGNOSIS — K43.2 INCISIONAL HERNIA, WITHOUT OBSTRUCTION OR GANGRENE: ICD-10-CM

## 2024-05-16 PROCEDURE — 1160F RVW MEDS BY RX/DR IN RCRD: CPT | Performed by: SURGERY

## 2024-05-16 PROCEDURE — 1159F MED LIST DOCD IN RCRD: CPT | Performed by: SURGERY

## 2024-05-16 PROCEDURE — 99024 POSTOP FOLLOW-UP VISIT: CPT | Performed by: SURGERY

## 2024-05-16 PROCEDURE — 1036F TOBACCO NON-USER: CPT | Performed by: SURGERY

## 2024-05-16 NOTE — PROGRESS NOTES
History Of Present Illness  Ariel Mayorga is a 78 y.o. male presenting he is status post both incisional hernia repair along with a laparoscopic left inguinal hernia repair his incisional hernia repair was above his umbilicus from his previous prostatectomy.  He is doing well he has no some swelling the left groin area.  He had a longstanding left inguinal hernia.      Last Recorded Vitals  Blood pressure 137/82, pulse 72, temperature 37.2 °C (99 °F), weight 70.4 kg (155 lb 1.6 oz).  Physical Exam incisional hernia at his umbilicus is well-healed.  He does have a seroma in his left groin I drained 60 cc of fluid.  This was done in a sterile fashion.      Assessment/Plan   He is recovering well from his incisional and left inguinal hernia.  If he develops a recurrence of the seroma he will follow back up with me.    Jonh Benjamin MD FACS  Professor of Surgery  Romina Clifford Chair in Surgical [place]  [facility] [employer]  [address] [place] [address], [address]  Phone [phone]  email: [email or web address]

## 2024-06-06 ENCOUNTER — TELEPHONE (OUTPATIENT)
Dept: PRIMARY CARE | Facility: CLINIC | Age: 79
End: 2024-06-06

## 2024-06-12 ENCOUNTER — APPOINTMENT (OUTPATIENT)
Dept: UROLOGY | Facility: HOSPITAL | Age: 79
End: 2024-06-12
Payer: MEDICARE

## 2024-06-27 ENCOUNTER — APPOINTMENT (OUTPATIENT)
Dept: SURGERY | Facility: CLINIC | Age: 79
End: 2024-06-27
Payer: MEDICARE

## 2024-06-27 VITALS
DIASTOLIC BLOOD PRESSURE: 67 MMHG | TEMPERATURE: 97.5 F | WEIGHT: 157.7 LBS | HEART RATE: 74 BPM | BODY MASS INDEX: 23.29 KG/M2 | SYSTOLIC BLOOD PRESSURE: 151 MMHG

## 2024-06-27 DIAGNOSIS — K40.90 UNILATERAL INGUINAL HERNIA WITHOUT OBSTRUCTION OR GANGRENE, RECURRENCE NOT SPECIFIED: Primary | ICD-10-CM

## 2024-06-27 PROCEDURE — 1159F MED LIST DOCD IN RCRD: CPT | Performed by: SURGERY

## 2024-06-27 PROCEDURE — 1036F TOBACCO NON-USER: CPT | Performed by: SURGERY

## 2024-06-27 PROCEDURE — 1125F AMNT PAIN NOTED PAIN PRSNT: CPT | Performed by: SURGERY

## 2024-06-27 PROCEDURE — 99024 POSTOP FOLLOW-UP VISIT: CPT | Performed by: SURGERY

## 2024-06-27 ASSESSMENT — PAIN SCALES - GENERAL: PAINLEVEL: 2

## 2024-06-27 NOTE — PROGRESS NOTES
History Of Present Illness  Ariel Mayorga is a 79 y.o. male presenting status post laparoscopic repair of a left inguinal hernia along with incisional hernia.  He had a seroma initially postoperatively.  He is back now for another assessment.  He still notices swelling in this area.      Last Recorded Vitals  Blood pressure 151/67, pulse 74, temperature 36.4 °C (97.5 °F), weight 71.5 kg (157 lb 11.2 oz).  Physical Exam stroma in the left groin area.  I aspirated 35 cc of fluid.      Assessment/Plan   Patient with a seroma in the left groin.  He will see if this recurs if it does he will follow back up with me.  Otherwise no limitations to activities    Jonh Benjamin MD FACS  Professor of Surgery  Romina Clifford Chair in Surgical [place]  [facility] [employer]  [address] [place] [address], [address]  Phone [phone]  email: [email or web address]

## 2024-08-12 ENCOUNTER — APPOINTMENT (OUTPATIENT)
Dept: UROLOGY | Facility: CLINIC | Age: 79
End: 2024-08-12
Payer: MEDICARE

## 2024-09-05 ENCOUNTER — APPOINTMENT (OUTPATIENT)
Dept: UROLOGY | Facility: CLINIC | Age: 79
End: 2024-09-05
Payer: MEDICARE

## 2024-09-26 ENCOUNTER — APPOINTMENT (OUTPATIENT)
Dept: OPHTHALMOLOGY | Facility: CLINIC | Age: 79
End: 2024-09-26
Payer: MEDICARE

## 2024-10-02 ENCOUNTER — APPOINTMENT (OUTPATIENT)
Dept: UROLOGY | Facility: CLINIC | Age: 79
End: 2024-10-02
Payer: MEDICARE

## 2024-10-30 DIAGNOSIS — H40.2231 CHRONIC ANGLE-CLOSURE GLAUCOMA OF BOTH EYES, MILD STAGE: ICD-10-CM

## 2024-10-30 RX ORDER — DORZOLAMIDE HYDROCHLORIDE AND TIMOLOL MALEATE 20; 5 MG/ML; MG/ML
1 SOLUTION/ DROPS OPHTHALMIC 2 TIMES DAILY
Qty: 10 ML | Refills: 6 | Status: SHIPPED | OUTPATIENT
Start: 2024-10-30

## 2024-12-09 ENCOUNTER — APPOINTMENT (OUTPATIENT)
Dept: PRIMARY CARE | Facility: CLINIC | Age: 79
End: 2024-12-09
Payer: MEDICARE

## 2024-12-09 ENCOUNTER — LAB (OUTPATIENT)
Dept: LAB | Facility: LAB | Age: 79
End: 2024-12-09
Payer: MEDICARE

## 2024-12-09 VITALS
BODY MASS INDEX: 23.25 KG/M2 | OXYGEN SATURATION: 96 % | DIASTOLIC BLOOD PRESSURE: 76 MMHG | HEART RATE: 68 BPM | HEIGHT: 69 IN | WEIGHT: 157 LBS | SYSTOLIC BLOOD PRESSURE: 134 MMHG

## 2024-12-09 DIAGNOSIS — E78.5 BORDERLINE HYPERLIPIDEMIA: ICD-10-CM

## 2024-12-09 DIAGNOSIS — R03.0 BLOOD PRESSURE ELEVATED WITHOUT HISTORY OF HTN: ICD-10-CM

## 2024-12-09 DIAGNOSIS — R39.12 BENIGN PROSTATIC HYPERPLASIA WITH WEAK URINARY STREAM: ICD-10-CM

## 2024-12-09 DIAGNOSIS — Z00.00 HEALTH CARE MAINTENANCE: Primary | ICD-10-CM

## 2024-12-09 DIAGNOSIS — N40.1 BENIGN PROSTATIC HYPERPLASIA WITH WEAK URINARY STREAM: ICD-10-CM

## 2024-12-09 LAB
ALBUMIN SERPL BCP-MCNC: 4.4 G/DL (ref 3.4–5)
ALP SERPL-CCNC: 64 U/L (ref 33–136)
ALT SERPL W P-5'-P-CCNC: 14 U/L (ref 10–52)
ANION GAP SERPL CALC-SCNC: 12 MMOL/L (ref 10–20)
AST SERPL W P-5'-P-CCNC: 22 U/L (ref 9–39)
BILIRUB SERPL-MCNC: 0.9 MG/DL (ref 0–1.2)
BUN SERPL-MCNC: 12 MG/DL (ref 6–23)
CALCIUM SERPL-MCNC: 9.8 MG/DL (ref 8.6–10.6)
CHLORIDE SERPL-SCNC: 101 MMOL/L (ref 98–107)
CHOLEST SERPL-MCNC: 143 MG/DL (ref 0–199)
CHOLESTEROL/HDL RATIO: 2.2
CO2 SERPL-SCNC: 31 MMOL/L (ref 21–32)
CREAT SERPL-MCNC: 0.94 MG/DL (ref 0.5–1.3)
EGFRCR SERPLBLD CKD-EPI 2021: 81 ML/MIN/1.73M*2
ERYTHROCYTE [DISTWIDTH] IN BLOOD BY AUTOMATED COUNT: 14.6 % (ref 11.5–14.5)
GLUCOSE SERPL-MCNC: 95 MG/DL (ref 74–99)
HCT VFR BLD AUTO: 39 % (ref 41–52)
HDLC SERPL-MCNC: 64.4 MG/DL
HGB BLD-MCNC: 12.8 G/DL (ref 13.5–17.5)
LDLC SERPL CALC-MCNC: 63 MG/DL
MCH RBC QN AUTO: 26.8 PG (ref 26–34)
MCHC RBC AUTO-ENTMCNC: 32.8 G/DL (ref 32–36)
MCV RBC AUTO: 82 FL (ref 80–100)
NON HDL CHOLESTEROL: 79 MG/DL (ref 0–149)
NRBC BLD-RTO: 0 /100 WBCS (ref 0–0)
PLATELET # BLD AUTO: 182 X10*3/UL (ref 150–450)
POTASSIUM SERPL-SCNC: 3.9 MMOL/L (ref 3.5–5.3)
PROT SERPL-MCNC: 6.8 G/DL (ref 6.4–8.2)
RBC # BLD AUTO: 4.77 X10*6/UL (ref 4.5–5.9)
SODIUM SERPL-SCNC: 140 MMOL/L (ref 136–145)
TRIGL SERPL-MCNC: 76 MG/DL (ref 0–149)
VLDL: 15 MG/DL (ref 0–40)
WBC # BLD AUTO: 4 X10*3/UL (ref 4.4–11.3)

## 2024-12-09 PROCEDURE — 80061 LIPID PANEL: CPT

## 2024-12-09 PROCEDURE — 1170F FXNL STATUS ASSESSED: CPT | Performed by: INTERNAL MEDICINE

## 2024-12-09 PROCEDURE — 1036F TOBACCO NON-USER: CPT | Performed by: INTERNAL MEDICINE

## 2024-12-09 PROCEDURE — 99397 PER PM REEVAL EST PAT 65+ YR: CPT | Performed by: INTERNAL MEDICINE

## 2024-12-09 PROCEDURE — 85027 COMPLETE CBC AUTOMATED: CPT

## 2024-12-09 PROCEDURE — G0439 PPPS, SUBSEQ VISIT: HCPCS | Performed by: INTERNAL MEDICINE

## 2024-12-09 PROCEDURE — 99214 OFFICE O/P EST MOD 30 MIN: CPT | Performed by: INTERNAL MEDICINE

## 2024-12-09 PROCEDURE — 93000 ELECTROCARDIOGRAM COMPLETE: CPT | Performed by: INTERNAL MEDICINE

## 2024-12-09 PROCEDURE — 36415 COLL VENOUS BLD VENIPUNCTURE: CPT

## 2024-12-09 PROCEDURE — 1159F MED LIST DOCD IN RCRD: CPT | Performed by: INTERNAL MEDICINE

## 2024-12-09 PROCEDURE — 1160F RVW MEDS BY RX/DR IN RCRD: CPT | Performed by: INTERNAL MEDICINE

## 2024-12-09 PROCEDURE — 80053 COMPREHEN METABOLIC PANEL: CPT

## 2024-12-09 NOTE — PROGRESS NOTES
"Physical exam okay Subjective   Patient ID: Ariel Mayorga is a 79 y.o. male who presents for Annual Exam.    HPI CPE see updated front sheet no chest pain no shortness of breath no side effect with medication dealing with his eyes and has decided so far not to have the ocular injections bowels normal no dysuria bones muscles joints okay had his hernia surgery moving somewhat slower because of his site condition weaker urine stream with nocturia    Past medical history he blood pressure and cholesterol    Medications noted and unchanged    Allergies no known drug allergies    Social history no tobacco    Family history noted and unchanged    Prevention Limited exercise follows up with ophthalmology some prior blood work reviewed no longer having colonoscopy    Depression screen not depressed    Review of Systems    Objective   /81   Pulse 68   Ht 1.753 m (5' 9\")   SpO2 96%   BMI 23.29 kg/m²     Physical Exam vital signs noted alert and oriented x 3 NCAT PERRLA EOMI nares without discharge OP benign TM normal bilateral EAC clear bilateral no AC nodes no JVD or bruit no thyromegaly chest clear to auscultation CV regular rate and rhythm S1-S2 without murmur gallop or rub abdomen soft nontender normal active bowel sounds LS spine normal curvature negative straight leg raise negative logroll negative SI joint tenderness extremities no clubbing cyanosis or edema normal distal pulses feet moving forward a little slight shuffle perhaps mainly because of his eyes DTR 2+    Assessment/Plan    impression General Medical examination prostate blood pressure cholesterol other diagnoses  Plan he will follow-up with the ophthalmologist regarding his eyes and ocular injections he will follow-up with the urologist for his PSA and urine symptoms there consider medication and avoid nsaids for that and bp check EKG advised on heart check comprehensive panel advise on glucose potassium and kidney function as well as liver " function check CBC advised on blood count check lipid panel advised on cholesterol profile good diet regular exercise as able especially in place exercise good water consumption weight stability then follow-up based on above 3 months TT 50 cc 26

## 2024-12-29 ASSESSMENT — PATIENT HEALTH QUESTIONNAIRE - PHQ9
SUM OF ALL RESPONSES TO PHQ9 QUESTIONS 1 AND 2: 0
2. FEELING DOWN, DEPRESSED OR HOPELESS: NOT AT ALL
1. LITTLE INTEREST OR PLEASURE IN DOING THINGS: NOT AT ALL

## 2024-12-29 ASSESSMENT — ACTIVITIES OF DAILY LIVING (ADL)
GROCERY_SHOPPING: INDEPENDENT
BATHING: INDEPENDENT
TAKING_MEDICATION: INDEPENDENT
DRESSING: INDEPENDENT
DOING_HOUSEWORK: INDEPENDENT
MANAGING_FINANCES: INDEPENDENT

## 2024-12-29 ASSESSMENT — ENCOUNTER SYMPTOMS
LOSS OF SENSATION IN FEET: 0
OCCASIONAL FEELINGS OF UNSTEADINESS: 0
DEPRESSION: 0

## 2025-01-18 DIAGNOSIS — H40.2231 CHRONIC ANGLE-CLOSURE GLAUCOMA OF BOTH EYES, MILD STAGE: ICD-10-CM

## 2025-01-18 DIAGNOSIS — H40.9 GLAUCOMA, UNSPECIFIED GLAUCOMA TYPE, UNSPECIFIED LATERALITY: ICD-10-CM

## 2025-01-20 RX ORDER — NETARSUDIL AND LATANOPROST OPHTHALMIC SOLUTION, 0.02%/0.005% .2; .05 MG/ML; MG/ML
1 SOLUTION/ DROPS OPHTHALMIC; TOPICAL NIGHTLY
Qty: 2.5 ML | Refills: 11 | Status: SHIPPED | OUTPATIENT
Start: 2025-01-20 | End: 2026-01-20

## 2025-02-14 ENCOUNTER — APPOINTMENT (OUTPATIENT)
Dept: UROLOGY | Facility: CLINIC | Age: 80
End: 2025-02-14
Payer: MEDICARE

## 2025-03-07 ENCOUNTER — APPOINTMENT (OUTPATIENT)
Dept: UROLOGY | Facility: CLINIC | Age: 80
End: 2025-03-07
Payer: MEDICARE

## 2025-03-10 DIAGNOSIS — C61 ADENOCARCINOMA OF PROSTATE (MULTI): Primary | ICD-10-CM

## 2025-03-24 ENCOUNTER — APPOINTMENT (OUTPATIENT)
Dept: PRIMARY CARE | Facility: CLINIC | Age: 80
End: 2025-03-24
Payer: MEDICARE

## 2025-03-28 ENCOUNTER — OFFICE VISIT (OUTPATIENT)
Dept: OPHTHALMOLOGY | Facility: CLINIC | Age: 80
End: 2025-03-28
Payer: MEDICARE

## 2025-03-28 DIAGNOSIS — H35.351 CYSTOID MACULAR EDEMA OF RIGHT EYE: ICD-10-CM

## 2025-03-28 DIAGNOSIS — H40.2231 CHRONIC ANGLE-CLOSURE GLAUCOMA OF BOTH EYES, MILD STAGE: Primary | ICD-10-CM

## 2025-03-28 DIAGNOSIS — H52.213 IRREGULAR ASTIGMATISM OF BOTH EYES: ICD-10-CM

## 2025-03-28 DIAGNOSIS — H25.13 AGE-RELATED NUCLEAR CATARACT OF BOTH EYES: ICD-10-CM

## 2025-03-28 PROCEDURE — 92136 OPHTHALMIC BIOMETRY: CPT | Performed by: OPHTHALMOLOGY

## 2025-03-28 PROCEDURE — 99214 OFFICE O/P EST MOD 30 MIN: CPT | Performed by: OPHTHALMOLOGY

## 2025-03-28 PROCEDURE — 92136 OPHTHALMIC BIOMETRY: CPT | Mod: BILATERAL PROCEDURE | Performed by: OPHTHALMOLOGY

## 2025-03-28 PROCEDURE — 1159F MED LIST DOCD IN RCRD: CPT | Performed by: OPHTHALMOLOGY

## 2025-03-28 RX ORDER — MV-MIN/FA/VIT K/LUTEIN/ZEAXANT 200MCG-5MG
CAPSULE ORAL
COMMUNITY

## 2025-03-28 ASSESSMENT — SLIT LAMP EXAM - LIDS
COMMENTS: GOOD POSITION
COMMENTS: GOOD POSITION

## 2025-03-28 ASSESSMENT — ENCOUNTER SYMPTOMS
NEUROLOGICAL NEGATIVE: 0
HEMATOLOGIC/LYMPHATIC NEGATIVE: 0
MUSCULOSKELETAL NEGATIVE: 0
PSYCHIATRIC NEGATIVE: 0
RESPIRATORY NEGATIVE: 0
ALLERGIC/IMMUNOLOGIC NEGATIVE: 0
CONSTITUTIONAL NEGATIVE: 0
CARDIOVASCULAR NEGATIVE: 0
EYES NEGATIVE: 1
ENDOCRINE NEGATIVE: 0
GASTROINTESTINAL NEGATIVE: 0

## 2025-03-28 ASSESSMENT — REFRACTION_WEARINGRX
OD_SPHERE: +3.00
OS_AXIS: 160
OS_CYLINDER: -1.00
OS_ADD: +2.75
OD_AXIS: 132
OS_SPHERE: +3.25
OD_CYLINDER: -0.50
OD_ADD: +2.75

## 2025-03-28 ASSESSMENT — VISUAL ACUITY
OD_CC: CF @ 2FT
CORRECTION_TYPE: GLASSES
METHOD: SNELLEN - LINEAR
OS_CC: HM

## 2025-03-28 ASSESSMENT — CONF VISUAL FIELD
OD_SUPERIOR_NASAL_RESTRICTION: 1
OD_SUPERIOR_TEMPORAL_RESTRICTION: 1
OS_SUPERIOR_TEMPORAL_RESTRICTION: 1
OD_INFERIOR_NASAL_RESTRICTION: 1
METHOD: COUNTING FINGERS
OS_INFERIOR_NASAL_RESTRICTION: 1
OS_INFERIOR_TEMPORAL_RESTRICTION: 1
OS_SUPERIOR_NASAL_RESTRICTION: 1
OD_INFERIOR_TEMPORAL_RESTRICTION: 1

## 2025-03-28 ASSESSMENT — TONOMETRY
OD_IOP_MMHG: 17
IOP_METHOD: TONOPEN
OS_IOP_MMHG: 18

## 2025-03-28 ASSESSMENT — EXTERNAL EXAM - LEFT EYE: OS_EXAM: NORMAL

## 2025-03-28 ASSESSMENT — EXTERNAL EXAM - RIGHT EYE: OD_EXAM: NORMAL

## 2025-03-28 ASSESSMENT — PACHYMETRY
OS_CT(UM): 502
OD_CT(UM): 501

## 2025-03-28 ASSESSMENT — CUP TO DISC RATIO
OD_RATIO: 0.9
OS_RATIO: 0.9

## 2025-03-28 NOTE — PROGRESS NOTES
chronic angle closure glaucoma, both eyes:  Patient has been lost to f/u for 1.5 years   tmax 28/27   gonio with pas OU   thin pachs   oct rnfl with thinning   severe cupping on exam   uncontrolled IOP   extensive family hx of glaucoma and blindness   re-d/w patient pathophys of glaucoma and potential blinding nature of disease, importance of compliance and f/u stressed >45 minute conversation    Continue  rocklatan qhs and cosopt BID OU   will proceed with phaco/migs, patient understands potential need for filtering surgery down the road      cme, right eye  Now chronic appearing  Patietn refused injection with Dr. Ingram   no nv noted   recommend re-establishing with retina for oct mac +/- injection   avoid dilation for now as angles are narrow    Ariel WOOD Tierra 78 y.o. presents with visually significant cataract of both eye(s). The cataract(s) is/are affecting activities of daily living including reading, watching tv, and driving. It is believed removal of the cataract will improve vision and thus quality of life. After discussing r/b/a to surgery the patient elected to proceedleft eye first then right. Lens options were discussed including pros/cons/and eligibility for monofocal, toric, multifocal, and toric multifocal lenses and patient elected to proceed with monofocal. patient's current mrx is hyperopic. target after surgery will be plano. patient has hx of chronic CME OD, severe chronic angle closure glaucoma that my effect surgery or post surgical visual outcome. The patient was told to continue all medications through surgery.  anesthesia will planned to be mac with topical. Will combine with abic

## 2025-03-31 PROBLEM — H25.13 AGE-RELATED NUCLEAR CATARACT OF BOTH EYES: Status: ACTIVE | Noted: 2025-03-28

## 2025-03-31 PROBLEM — H40.2231 CHRONIC ANGLE-CLOSURE GLAUCOMA OF BOTH EYES, MILD STAGE: Status: ACTIVE | Noted: 2025-03-28

## 2025-03-31 RX ORDER — DICLOFENAC SODIUM 1 MG/ML
1 SOLUTION/ DROPS OPHTHALMIC
OUTPATIENT
Start: 2025-03-31 | End: 2025-03-31

## 2025-03-31 RX ORDER — PHENYLEPHRINE HYDROCHLORIDE 25 MG/ML
1 SOLUTION/ DROPS OPHTHALMIC
OUTPATIENT
Start: 2025-03-31 | End: 2025-03-31

## 2025-03-31 RX ORDER — TROPICAMIDE 10 MG/ML
1 SOLUTION/ DROPS OPHTHALMIC
OUTPATIENT
Start: 2025-03-31 | End: 2025-03-31

## 2025-03-31 RX ORDER — MOXIFLOXACIN 5 MG/ML
1 SOLUTION/ DROPS OPHTHALMIC
OUTPATIENT
Start: 2025-03-31 | End: 2025-03-31

## 2025-03-31 RX ORDER — CYCLOPENTOLATE HYDROCHLORIDE 10 MG/ML
1 SOLUTION/ DROPS OPHTHALMIC
OUTPATIENT
Start: 2025-03-31 | End: 2025-03-31

## 2025-03-31 ASSESSMENT — TONOMETRY: IOP_METHOD: GOLDMANN APPLANATION

## 2025-04-14 ENCOUNTER — APPOINTMENT (OUTPATIENT)
Dept: PRIMARY CARE | Facility: CLINIC | Age: 80
End: 2025-04-14
Payer: MEDICARE

## 2025-04-17 ENCOUNTER — APPOINTMENT (OUTPATIENT)
Dept: OPHTHALMOLOGY | Facility: CLINIC | Age: 80
End: 2025-04-17
Payer: MEDICARE

## 2025-04-23 DIAGNOSIS — Z98.41 CATARACT EXTRACTION STATUS OF RIGHT EYE: Primary | ICD-10-CM

## 2025-04-23 PROCEDURE — RXMED WILLOW AMBULATORY MEDICATION CHARGE

## 2025-04-23 RX ORDER — MOXIFLOXACIN 5 MG/ML
1 SOLUTION/ DROPS OPHTHALMIC 4 TIMES DAILY
COMMUNITY
End: 2025-04-23 | Stop reason: SDUPTHER

## 2025-04-23 RX ORDER — PREDNISOLONE ACETATE 10 MG/ML
1 SUSPENSION/ DROPS OPHTHALMIC 4 TIMES DAILY
COMMUNITY
End: 2025-04-23 | Stop reason: SDUPTHER

## 2025-04-23 RX ORDER — MOXIFLOXACIN 5 MG/ML
1 SOLUTION/ DROPS OPHTHALMIC 4 TIMES DAILY
Qty: 3 ML | Refills: 0 | Status: SHIPPED | OUTPATIENT
Start: 2025-04-23

## 2025-04-23 RX ORDER — PREDNISOLONE ACETATE 10 MG/ML
1 SUSPENSION/ DROPS OPHTHALMIC 4 TIMES DAILY
Qty: 5 ML | Refills: 0 | Status: SHIPPED | OUTPATIENT
Start: 2025-04-23

## 2025-04-24 ENCOUNTER — PHARMACY VISIT (OUTPATIENT)
Dept: PHARMACY | Facility: CLINIC | Age: 80
End: 2025-04-24
Payer: COMMERCIAL

## 2025-04-24 ENCOUNTER — HOSPITAL ENCOUNTER (OUTPATIENT)
Facility: CLINIC | Age: 80
Setting detail: OUTPATIENT SURGERY
Discharge: HOME | End: 2025-04-24
Attending: OPHTHALMOLOGY | Admitting: OPHTHALMOLOGY
Payer: MEDICARE

## 2025-04-24 ENCOUNTER — ANESTHESIA (OUTPATIENT)
Dept: OPERATING ROOM | Facility: CLINIC | Age: 80
End: 2025-04-24
Payer: MEDICARE

## 2025-04-24 ENCOUNTER — ANESTHESIA EVENT (OUTPATIENT)
Dept: OPERATING ROOM | Facility: CLINIC | Age: 80
End: 2025-04-24
Payer: MEDICARE

## 2025-04-24 VITALS
HEART RATE: 74 BPM | DIASTOLIC BLOOD PRESSURE: 75 MMHG | TEMPERATURE: 97.9 F | OXYGEN SATURATION: 98 % | RESPIRATION RATE: 16 BRPM | SYSTOLIC BLOOD PRESSURE: 130 MMHG

## 2025-04-24 DIAGNOSIS — H40.2231 CHRONIC ANGLE-CLOSURE GLAUCOMA OF BOTH EYES, MILD STAGE: Primary | ICD-10-CM

## 2025-04-24 DIAGNOSIS — H25.13 AGE-RELATED NUCLEAR CATARACT OF BOTH EYES: ICD-10-CM

## 2025-04-24 PROCEDURE — 7100000009 HC PHASE TWO TIME - INITIAL BASE CHARGE: Performed by: OPHTHALMOLOGY

## 2025-04-24 PROCEDURE — 2760000001 HC OR 276 NO HCPCS: Performed by: OPHTHALMOLOGY

## 2025-04-24 PROCEDURE — 2500000004 HC RX 250 GENERAL PHARMACY W/ HCPCS (ALT 636 FOR OP/ED): Performed by: OPHTHALMOLOGY

## 2025-04-24 PROCEDURE — 2500000005 HC RX 250 GENERAL PHARMACY W/O HCPCS: Performed by: OPHTHALMOLOGY

## 2025-04-24 PROCEDURE — A65820 PR RELIEVE INNER EYE PRESSURE: Performed by: NURSE ANESTHETIST, CERTIFIED REGISTERED

## 2025-04-24 PROCEDURE — 2500000004 HC RX 250 GENERAL PHARMACY W/ HCPCS (ALT 636 FOR OP/ED)

## 2025-04-24 PROCEDURE — 2720000007 HC OR 272 NO HCPCS: Performed by: OPHTHALMOLOGY

## 2025-04-24 PROCEDURE — 99100 ANES PT EXTEME AGE<1 YR&>70: CPT | Performed by: ANESTHESIOLOGY

## 2025-04-24 PROCEDURE — 3700000001 HC GENERAL ANESTHESIA TIME - INITIAL BASE CHARGE: Performed by: OPHTHALMOLOGY

## 2025-04-24 PROCEDURE — V2632 POST CHMBR INTRAOCULAR LENS: HCPCS | Performed by: OPHTHALMOLOGY

## 2025-04-24 PROCEDURE — 65820 GONIOTOMY: CPT | Performed by: OPHTHALMOLOGY

## 2025-04-24 PROCEDURE — 3600000008 HC OR TIME - EACH INCREMENTAL 1 MINUTE - PROCEDURE LEVEL THREE: Performed by: OPHTHALMOLOGY

## 2025-04-24 PROCEDURE — 7100000010 HC PHASE TWO TIME - EACH INCREMENTAL 1 MINUTE: Performed by: OPHTHALMOLOGY

## 2025-04-24 PROCEDURE — 66984 XCAPSL CTRC RMVL W/O ECP: CPT | Performed by: OPHTHALMOLOGY

## 2025-04-24 PROCEDURE — A65820 PR RELIEVE INNER EYE PRESSURE: Performed by: ANESTHESIOLOGY

## 2025-04-24 PROCEDURE — 3700000002 HC GENERAL ANESTHESIA TIME - EACH INCREMENTAL 1 MINUTE: Performed by: OPHTHALMOLOGY

## 2025-04-24 PROCEDURE — 3600000003 HC OR TIME - INITIAL BASE CHARGE - PROCEDURE LEVEL THREE: Performed by: OPHTHALMOLOGY

## 2025-04-24 DEVICE — CLAREON ASPHERIC HYDROPHOBIC ACRYLIC IOL WITH THE AUTONOMEAUTOMATED PRE-LOADED DELIVERY SYSTEM
Type: IMPLANTABLE DEVICE | Site: EYE | Status: FUNCTIONAL
Brand: CLAREON™

## 2025-04-24 RX ORDER — PHENYLEPHRINE HYDROCHLORIDE 25 MG/ML
1 SOLUTION/ DROPS OPHTHALMIC
Status: SHIPPED | OUTPATIENT
Start: 2025-04-24 | End: 2025-04-24

## 2025-04-24 RX ORDER — PHENYLEPHRINE HYDROCHLORIDE 25 MG/ML
1 SOLUTION/ DROPS OPHTHALMIC
Status: COMPLETED | OUTPATIENT
Start: 2025-04-24 | End: 2025-04-24

## 2025-04-24 RX ORDER — TETRACAINE HYDROCHLORIDE 5 MG/ML
1 SOLUTION OPHTHALMIC ONCE
OUTPATIENT
Start: 2025-04-24

## 2025-04-24 RX ORDER — TROPICAMIDE 10 MG/ML
1 SOLUTION/ DROPS OPHTHALMIC
Status: SHIPPED | OUTPATIENT
Start: 2025-04-24 | End: 2025-04-24

## 2025-04-24 RX ORDER — FENTANYL CITRATE 50 UG/ML
50 INJECTION, SOLUTION INTRAMUSCULAR; INTRAVENOUS EVERY 5 MIN PRN
Status: DISCONTINUED | OUTPATIENT
Start: 2025-04-24 | End: 2025-04-24 | Stop reason: HOSPADM

## 2025-04-24 RX ORDER — CYCLOPENTOLATE HYDROCHLORIDE 10 MG/ML
1 SOLUTION/ DROPS OPHTHALMIC
OUTPATIENT
Start: 2025-04-24 | End: 2025-04-24

## 2025-04-24 RX ORDER — ACETAMINOPHEN 325 MG/1
650 TABLET ORAL EVERY 4 HOURS PRN
Status: DISCONTINUED | OUTPATIENT
Start: 2025-04-24 | End: 2025-04-24 | Stop reason: HOSPADM

## 2025-04-24 RX ORDER — OXYCODONE HYDROCHLORIDE 5 MG/1
5 TABLET ORAL EVERY 4 HOURS PRN
Status: DISCONTINUED | OUTPATIENT
Start: 2025-04-24 | End: 2025-04-24 | Stop reason: HOSPADM

## 2025-04-24 RX ORDER — DICLOFENAC SODIUM 1 MG/ML
1 SOLUTION/ DROPS OPHTHALMIC
Status: SHIPPED | OUTPATIENT
Start: 2025-04-24 | End: 2025-04-24

## 2025-04-24 RX ORDER — CYCLOPENTOLATE HYDROCHLORIDE 10 MG/ML
1 SOLUTION/ DROPS OPHTHALMIC
Status: SHIPPED | OUTPATIENT
Start: 2025-04-24 | End: 2025-04-24

## 2025-04-24 RX ORDER — MOXIFLOXACIN 5 MG/ML
1 SOLUTION/ DROPS OPHTHALMIC
Status: SHIPPED | OUTPATIENT
Start: 2025-04-24 | End: 2025-04-24

## 2025-04-24 RX ORDER — MOXIFLOXACIN 5 MG/ML
1 SOLUTION/ DROPS OPHTHALMIC
OUTPATIENT
Start: 2025-04-24 | End: 2025-04-24

## 2025-04-24 RX ORDER — DICLOFENAC SODIUM 1 MG/ML
1 SOLUTION/ DROPS OPHTHALMIC
Status: COMPLETED | OUTPATIENT
Start: 2025-04-24 | End: 2025-04-24

## 2025-04-24 RX ORDER — MIDAZOLAM HYDROCHLORIDE 1 MG/ML
INJECTION, SOLUTION INTRAMUSCULAR; INTRAVENOUS AS NEEDED
Status: DISCONTINUED | OUTPATIENT
Start: 2025-04-24 | End: 2025-04-24

## 2025-04-24 RX ORDER — DICLOFENAC SODIUM 1 MG/ML
1 SOLUTION/ DROPS OPHTHALMIC
OUTPATIENT
Start: 2025-04-24 | End: 2025-04-24

## 2025-04-24 RX ORDER — LIDOCAINE HYDROCHLORIDE 20 MG/ML
INJECTION, SOLUTION INFILTRATION; PERINEURAL AS NEEDED
Status: DISCONTINUED | OUTPATIENT
Start: 2025-04-24 | End: 2025-04-24 | Stop reason: HOSPADM

## 2025-04-24 RX ORDER — DICLOFENAC SODIUM 1 MG/ML
SOLUTION/ DROPS OPHTHALMIC AS NEEDED
Status: DISCONTINUED | OUTPATIENT
Start: 2025-04-24 | End: 2025-04-24 | Stop reason: HOSPADM

## 2025-04-24 RX ORDER — TROPICAMIDE 10 MG/ML
1 SOLUTION/ DROPS OPHTHALMIC
Status: COMPLETED | OUTPATIENT
Start: 2025-04-24 | End: 2025-04-24

## 2025-04-24 RX ORDER — PHENYLEPHRINE HYDROCHLORIDE 25 MG/ML
1 SOLUTION/ DROPS OPHTHALMIC
OUTPATIENT
Start: 2025-04-24 | End: 2025-04-24

## 2025-04-24 RX ORDER — LIDOCAINE HYDROCHLORIDE 10 MG/ML
0.1 INJECTION, SOLUTION EPIDURAL; INFILTRATION; INTRACAUDAL; PERINEURAL ONCE
Status: DISCONTINUED | OUTPATIENT
Start: 2025-04-24 | End: 2025-04-24 | Stop reason: HOSPADM

## 2025-04-24 RX ORDER — PREDNISOLONE ACETATE 10 MG/ML
SUSPENSION/ DROPS OPHTHALMIC AS NEEDED
Status: DISCONTINUED | OUTPATIENT
Start: 2025-04-24 | End: 2025-04-24 | Stop reason: HOSPADM

## 2025-04-24 RX ORDER — EPINEPHRINE 1 MG/ML
INJECTION, SOLUTION, CONCENTRATE INTRAVENOUS AS NEEDED
Status: DISCONTINUED | OUTPATIENT
Start: 2025-04-24 | End: 2025-04-24 | Stop reason: HOSPADM

## 2025-04-24 RX ORDER — ONDANSETRON HYDROCHLORIDE 2 MG/ML
4 INJECTION, SOLUTION INTRAVENOUS ONCE AS NEEDED
Status: DISCONTINUED | OUTPATIENT
Start: 2025-04-24 | End: 2025-04-24 | Stop reason: HOSPADM

## 2025-04-24 RX ORDER — MOXIFLOXACIN 5 MG/ML
SOLUTION/ DROPS OPHTHALMIC AS NEEDED
Status: DISCONTINUED | OUTPATIENT
Start: 2025-04-24 | End: 2025-04-24 | Stop reason: HOSPADM

## 2025-04-24 RX ORDER — TETRACAINE HYDROCHLORIDE 5 MG/ML
SOLUTION OPHTHALMIC AS NEEDED
Status: DISCONTINUED | OUTPATIENT
Start: 2025-04-24 | End: 2025-04-24 | Stop reason: HOSPADM

## 2025-04-24 RX ORDER — TROPICAMIDE 10 MG/ML
1 SOLUTION/ DROPS OPHTHALMIC
OUTPATIENT
Start: 2025-04-24 | End: 2025-04-24

## 2025-04-24 RX ORDER — MOXIFLOXACIN 5 MG/ML
1 SOLUTION/ DROPS OPHTHALMIC
Status: COMPLETED | OUTPATIENT
Start: 2025-04-24 | End: 2025-04-24

## 2025-04-24 RX ORDER — CYCLOPENTOLATE HYDROCHLORIDE 10 MG/ML
1 SOLUTION/ DROPS OPHTHALMIC
Status: COMPLETED | OUTPATIENT
Start: 2025-04-24 | End: 2025-04-24

## 2025-04-24 RX ADMIN — PHENYLEPHRINE HYDROCHLORIDE 1 DROP: 25 SOLUTION/ DROPS OPHTHALMIC at 07:25

## 2025-04-24 RX ADMIN — PHENYLEPHRINE HYDROCHLORIDE 1 DROP: 25 SOLUTION/ DROPS OPHTHALMIC at 07:20

## 2025-04-24 RX ADMIN — DICLOFENAC SODIUM 1 DROP: 1 SOLUTION/ DROPS OPHTHALMIC at 07:25

## 2025-04-24 RX ADMIN — MOXIFLOXACIN HYDROCHLORIDE 1 DROP: 5 SOLUTION/ DROPS OPHTHALMIC at 07:20

## 2025-04-24 RX ADMIN — MIDAZOLAM 0.5 MG: 1 INJECTION INTRAMUSCULAR; INTRAVENOUS at 08:46

## 2025-04-24 RX ADMIN — MIDAZOLAM 0.5 MG: 1 INJECTION INTRAMUSCULAR; INTRAVENOUS at 08:58

## 2025-04-24 RX ADMIN — TROPICAMIDE 1 DROP: 10 SOLUTION/ DROPS OPHTHALMIC at 07:20

## 2025-04-24 RX ADMIN — CYCLOPENTOLATE HYDROCHLORIDE 1 DROP: 10 SOLUTION/ DROPS OPHTHALMIC at 07:20

## 2025-04-24 RX ADMIN — CYCLOPENTOLATE HYDROCHLORIDE 1 DROP: 10 SOLUTION/ DROPS OPHTHALMIC at 07:30

## 2025-04-24 RX ADMIN — TROPICAMIDE 1 DROP: 10 SOLUTION/ DROPS OPHTHALMIC at 07:25

## 2025-04-24 RX ADMIN — DICLOFENAC SODIUM 1 DROP: 1 SOLUTION/ DROPS OPHTHALMIC at 07:30

## 2025-04-24 RX ADMIN — DICLOFENAC SODIUM 1 DROP: 1 SOLUTION/ DROPS OPHTHALMIC at 07:20

## 2025-04-24 RX ADMIN — MOXIFLOXACIN HYDROCHLORIDE 1 DROP: 5 SOLUTION/ DROPS OPHTHALMIC at 07:25

## 2025-04-24 RX ADMIN — MOXIFLOXACIN HYDROCHLORIDE 1 DROP: 5 SOLUTION/ DROPS OPHTHALMIC at 07:30

## 2025-04-24 RX ADMIN — TROPICAMIDE 1 DROP: 10 SOLUTION/ DROPS OPHTHALMIC at 07:30

## 2025-04-24 RX ADMIN — PHENYLEPHRINE HYDROCHLORIDE 1 DROP: 25 SOLUTION/ DROPS OPHTHALMIC at 07:30

## 2025-04-24 RX ADMIN — CYCLOPENTOLATE HYDROCHLORIDE 1 DROP: 10 SOLUTION/ DROPS OPHTHALMIC at 07:25

## 2025-04-24 RX ADMIN — MIDAZOLAM 0.5 MG: 1 INJECTION INTRAMUSCULAR; INTRAVENOUS at 09:09

## 2025-04-24 ASSESSMENT — COLUMBIA-SUICIDE SEVERITY RATING SCALE - C-SSRS
6. HAVE YOU EVER DONE ANYTHING, STARTED TO DO ANYTHING, OR PREPARED TO DO ANYTHING TO END YOUR LIFE?: NO
2. HAVE YOU ACTUALLY HAD ANY THOUGHTS OF KILLING YOURSELF?: NO
1. IN THE PAST MONTH, HAVE YOU WISHED YOU WERE DEAD OR WISHED YOU COULD GO TO SLEEP AND NOT WAKE UP?: NO

## 2025-04-24 ASSESSMENT — PAIN - FUNCTIONAL ASSESSMENT
PAIN_FUNCTIONAL_ASSESSMENT: 0-10

## 2025-04-24 ASSESSMENT — PAIN SCALES - GENERAL
PAINLEVEL_OUTOF10: 0 - NO PAIN

## 2025-04-24 NOTE — ANESTHESIA PREPROCEDURE EVALUATION
Patient: Ariel Mayorga    Procedure Information       Date/Time: 04/24/25 0818    Procedures:       Extraction Cataract with Insertion Intraocular Lens OD (Right)      Goniotomy (Right)    Location: [facility] OR 02 / [facility] OR    Surgeons: Jacqueline Richardson MD            Relevant Problems   /Renal   (+) Adenocarcinoma of prostate (Multi)      Musculoskeletal   (+) Rheumatoid arthritis      HEENT   (+) Chronic angle-closure glaucoma of both eyes, mild stage   (+) Glaucoma       Clinical information reviewed:   Tobacco  Allergies  Meds   Med Hx  Surg Hx   Fam Hx  Soc Hx        NPO Detail:  NPO/Void Status  Date of Last Liquid: 04/23/25  Time of Last Liquid: 1900  Date of Last Solid: 04/23/25  Time of Last Solid: 1900  Last Intake Type: Clear fluids         Physical Exam    Airway  Mallampati: III  TM distance: >3 FB  Neck ROM: full  Mouth opening: 3 or more finger widths     Cardiovascular    Dental    Pulmonary    Abdominal            Anesthesia Plan    History of general anesthesia?: yes  History of complications of general anesthesia?: no    ASA 2     MAC     intravenous induction   Anesthetic plan and risks discussed with patient.

## 2025-04-24 NOTE — ANESTHESIA POSTPROCEDURE EVALUATION
Patient: Ariel Mayorga    Procedure Summary       Date: 04/24/25 Room / Location: [facility] OR 02 / [facility]    Anesthesia Start: 0832 Anesthesia Stop: 0922    Procedures:       Extraction Cataract with Insertion Intraocular Lens OD (Left)      Goniotomy (Left) Diagnosis:       Chronic angle-closure glaucoma of both eyes, mild stage      Age-related nuclear cataract of both eyes      (Chronic angle-closure glaucoma of both eyes, mild stage [H40.2231])      (Age-related nuclear cataract of both eyes [H25.13])    Surgeons: Jacqueline Richardson MD Responsible Provider: Anuja John MD    Anesthesia Type: MAC ASA Status: 2            Anesthesia Type: MAC    Vitals Value Taken Time   /75 04/24/25 09:50   Temp 36.6 °C (97.9 °F) 04/24/25 09:50   Pulse 74 04/24/25 09:50   Resp 16 04/24/25 09:50   SpO2 98 % 04/24/25 09:50       Anesthesia Post Evaluation    Patient location during evaluation: PACU  Patient participation: complete - patient participated  Level of consciousness: awake  Pain management: adequate  Airway patency: patent  Cardiovascular status: acceptable  Respiratory status: acceptable  Hydration status: acceptable  Postoperative Nausea and Vomiting: none        No notable events documented.

## 2025-04-24 NOTE — DISCHARGE INSTRUCTIONS
Surgeon: Jacqueline Richardson MD    Patient name: Ariel Mayorga    Date of visit:April 24, 2025      left cataract surgery post op instructions    Drops after surgery:  Moxifloxacin (tan) 4x a day LEFT eye starting at 6:00pm tonight   Prednisolone (white/pink) 4x a day LEFT eye starting at 6:00pm tonight   Also continue your Rocklatan at bedtime in BOTH eyes and your dorzolamide-timolol 2x a day in BOTH eyes      Wait 5 minutes in between drops    Bring all drops with you to your appointment tomorrow    No heavy lifting over 10-15 lbs, no bending over, do not get eye wet, no eye rubbing. Wear eye shield at anytime you are sleeping. Shower from neck down only. Please call immediately if you develop worsening redness, pain, decreased vision, flashes, floaters.       During regular business hours call: [phone] ([phone]), choose option for [place], and Dr. Richardson [place]  After hours call hospital : [phone], ask to speak with on-call ophthalmology resident

## 2025-04-24 NOTE — OP NOTE
Operative Note     Date: 2025  OR Location: [facility] OR    Name: Ariel Mayorga, : 1945, Age: 79 y.o., MRN: [id number], Sex: male    Diagnosis  Pre-op Diagnosis      * Chronic angle-closure glaucoma of both eyes, mild stage [H40.2231]     * Age-related nuclear cataract of both eyes [H25.13] Post-op Diagnosis     * Chronic angle-closure glaucoma of both eyes, mild stage [H40.2231]     * Age-related nuclear cataract of both eyes [H25.13]     Procedures  Extraction Cataract with Insertion Intraocular Lens OD  89718 - [place] XCAPSL CTRC RMVL INSJ IO LENS PROSTH W/O ECP    Goniotomy  06668 - [place] GONIOTOMY  , LEFT    Surgeons   Panel 1:     * Jacqueline Richardson - Primary  Panel 2:     * Jacqueline Richardson - Primary    Resident/Fellow/Other Assistant:  Jasen    Procedure Summary  Anesthesia: Monitor Anesthesia Care  ASA: II  Anesthesia Staff:   Anesthesiologist: Anuja John MD  CRNA: JO ANN Funk-CRNA  SRNA: Orion Salinas  Estimated Blood Loss: none        Specimen: No specimens collected     Staff:   Circulator: Arnulfo Reese RN; Sanjuana Shore RN  Scrub Person: Bianca Meza RN; Daryl Mancuso; Kathy Kyle          Findings: as expected    Indications: Ariel Mayorga is an 79 y.o. male who is having surgery for Chronic angle-closure glaucoma of both eyes, mild stage [H40.2231]  Age-related nuclear cataract of both eyes [H25.13]. LEFT    The patient was seen in the preoperative area. The risks, benefits, complications, treatment options, non-operative alternatives, expected recovery and outcomes were discussed with the patient. The possibilities of reaction to medication, pulmonary aspiration, injury to surrounding structures, bleeding, recurrent infection, the need for additional procedures, failure to diagnose a condition, and creating a complication or operation were discussed with the patient. The patient concurred with the proposed plan, giving informed consent.  The site of surgery was  properly noted/marked if necessary per policy.     Procedure Details:   The patient was escorted to the operating room where a time out was completed   and monitored anesthesia care was begun. The patient was prepped and draped in   the usual sterile fashion for intraocular surgery. A lid speculum was placed   and the operating microscope was positioned. A paracentesis was made to the   left of the planned cataract incision with a 1mm blade.  Shugarcaine followed by Viscoat was used to   replace the aqueous humor. A temporal clear corneal wound was made with a 2.4   mm keratome, extending 2 mm into clear cornea before entering the anterior   chamber. A continuous curvilinear  capsulorhexis of approximately 5 mm in   diameter was performed. Hydrodissection was performed using a canula. The   pre-chopper was used to crack the nucleus into smaller pieces which were then   removed with the assistance of a horizontal chopper and phaco hand piece.   Residual cortex was removed with IA. ProVisc was used to inflate the capsular   bag. The lens implant  lyubov CNAUTO 22.5 diopter intraocular lens. The lens was   inserted into the capsular bag and rotated to the proper position with a   gerson. The patient was then positioned for direct gonioscopy. A hour 3 clock hour nasal goniotomy was made.   The microcatheter was primed and then thread for 360 degrees and then retracted whilst   injecting viscoelastic approximately 8-10 clock hours per quadrant. the device   was then removed and the patient was repositioned supine. Residual   viscoelastic was removed from the eye with the irrigation/aspiration   instrument. The wounds were checked and found to be watertight. The anterior   chamber was at physiologic depth and pressure. The lid speculum was removed and   a patch and shield were taped in place. The patient tolerated the procedure   well, and there were no complications.    Complications:  None   Disposition:  stable          Attending Attestation: I was present and scrubbed for the entire procedure    Jacqueline Richardson  Phone Number: [phone]

## 2025-04-24 NOTE — H&P
History Of Present Illness  Ariel Mayorga is a 79 y.o. male presenting with left eye cataract and glaucoma .     Past Medical History  He has a past medical history of Cataract, Cellulitis of face (10/09/2014), Encounter for immunization (10/16/2015), Encounter for screening for malignant neoplasm of prostate (03/03/2015), Glaucoma, Other conditions influencing health status, Personal history of other diseases of the nervous system and sense organs, Personal history of other diseases of the nervous system and sense organs, and Personal history of other specified conditions (03/03/2015).    Surgical History  He has a past surgical history that includes Other surgical history (07/29/2013); Prostatectomy (12/03/2013); Colonoscopy (12/03/2013); and Hernia repair.     Social History  He reports that he has never smoked. He has never been exposed to tobacco smoke. He has never used smokeless tobacco. He reports current alcohol use. He reports that he does not use drugs.     Allergies  Patient has no known allergies.    Meds  Current Outpatient Medications   Medication Instructions    cholecalciferol (VITAMIN D-3) 50 mcg, Daily    dorzolamide-timoloL (Cosopt) 22.3-6.8 mg/mL ophthalmic solution 1 drop, Both Eyes, 2 times daily    moxifloxacin (Vigamox) 0.5 % ophthalmic solution 1 drop, Right Eye, 4 times daily    multivitamin tablet 1 tablet, Daily    mv-min-FA-vit K-lutein-zeaxant (PreserVision AREDS 2 Plus MV) 200 mcg-15 mcg- 5 mg-1 mg capsule Take by mouth.    prednisoLONE acetate (Pred-Forte) 1 % ophthalmic suspension 1 drop, Right Eye, 4 times daily    Rocklatan 0.02-0.005 % drops 1 drop, Both Eyes, Nightly         ROS  Patient denies ocular pain, redness, discharge, decreased vision, double vision, blind spots, flashes, or floaters.     Physical Exam  Not recorded     Constitutional: No acute distress   HEENT: mucus membranes moist, atraumatic   Respiratory: no respiratory distress   Cardiovascular: no peripheral  edema  Abdomen: non distended   MSK/neuro: moves all extremities spontaneously   Skin: no rashes   Psych: alert and oriented        Assessment/Plan   Assessment & Plan  Chronic angle-closure glaucoma of both eyes, mild stage    Age-related nuclear cataract of both eyes      Plan cataract surgery and minimally invasive glaucoma surgery, left eye.             Brant Aggarwal MD

## 2025-04-25 ENCOUNTER — TELEPHONE (OUTPATIENT)
Dept: OPHTHALMOLOGY | Facility: HOSPITAL | Age: 80
End: 2025-04-25

## 2025-04-25 ENCOUNTER — APPOINTMENT (OUTPATIENT)
Dept: OPHTHALMOLOGY | Facility: CLINIC | Age: 80
End: 2025-04-25
Payer: MEDICARE

## 2025-04-25 DIAGNOSIS — Z96.1 PSEUDOPHAKIA: Primary | ICD-10-CM

## 2025-04-25 PROCEDURE — 99024 POSTOP FOLLOW-UP VISIT: CPT | Performed by: OPHTHALMOLOGY

## 2025-04-25 ASSESSMENT — TONOMETRY
IOP_METHOD: GOLDMANN APPLANATION
OS_IOP_MMHG: 10

## 2025-04-25 ASSESSMENT — ENCOUNTER SYMPTOMS
ALLERGIC/IMMUNOLOGIC NEGATIVE: 0
MUSCULOSKELETAL NEGATIVE: 0
RESPIRATORY NEGATIVE: 0
ENDOCRINE NEGATIVE: 0
HEMATOLOGIC/LYMPHATIC NEGATIVE: 0
EYES NEGATIVE: 1
GASTROINTESTINAL NEGATIVE: 0
CARDIOVASCULAR NEGATIVE: 0
CONSTITUTIONAL NEGATIVE: 0
PSYCHIATRIC NEGATIVE: 0
NEUROLOGICAL NEGATIVE: 0

## 2025-04-25 ASSESSMENT — PAIN SCALES - GENERAL: PAINLEVEL_OUTOF10: 0 - NO PAIN

## 2025-04-25 ASSESSMENT — VISUAL ACUITY
OS_SC: 20/250
METHOD: SNELLEN - LINEAR

## 2025-04-25 ASSESSMENT — PACHYMETRY
OD_CT(UM): 501
OS_CT(UM): 502

## 2025-04-25 ASSESSMENT — SLIT LAMP EXAM - LIDS
COMMENTS: GOOD POSITION
COMMENTS: GOOD POSITION

## 2025-04-25 ASSESSMENT — CUP TO DISC RATIO
OS_RATIO: 0.9
OD_RATIO: 0.9

## 2025-04-25 NOTE — TELEPHONE ENCOUNTER
Patient of Dr. Richardson who had cataract surgery with goniotomy, left eye, calling about drop clarification. Daughter states that print out received at surgery lists prednisolone, moxifloxacin, and dicloflenac to start using, but she only received prednisolone and moxifloxacin. Clarified with patient that updated discharge instructions in patient's chart from Dr. Richardson's team list only prednisolone and moxifloxacin, they may have received a prior version, and to continue using glaucoma drops rocklatan and cosopt. They voiced understanding.

## 2025-04-25 NOTE — PROGRESS NOTES
chronic angle closure glaucoma, both eyes:  Patient has again been lost to follow up now with marked loss of vision  Unclear if glaucoma has snuffed, retina is worse or cataracts is cause of decline although given the view to back is still intact concern is that he snuffed   tmax 28/27   gonio with pas OU   thin pachs   oct rnfl with thinning   severe cupping on exam   uncontrolled IOP   extensive family hx of glaucoma and blindness  Continue  rocklatan qhs and cosopt BID OU   will proceed with phaco/migs, patient understands potential need for filtering surgery down the road      cme, right eye  Now chronic appearing  Patietn refused injection with Dr. Ingram and Dr. Conway  Will redirect to retina after cataracts are out depending on visual potential    Ariel Mayorga 78 y.o. presents with visually significant cataract of both eye(s). The cataract(s) is/are affecting activities of daily living including reading, watching tv, and driving. It is believed removal of the cataract will improve vision and thus quality of life. After discussing r/b/a to surgery the patient elected to proceedleft eye first then right. Lens options were discussed including pros/cons/and eligibility for monofocal, toric, multifocal, and toric multifocal lenses and patient elected to proceed with monofocal. patient's current mrx is hyperopic. target after surgery will be plano. patient has hx of chronic CME OD, severe chronic angle closure glaucoma that my effect surgery or post surgical visual outcome. The patient was told to continue all medications through surgery.  anesthesia will planned to be mac with topical. Will combine with abic. Will use Ks from sarahi OS      POD1 s/p ce/iol/abic, left   doing well.   Continue predmoxi QID  Continue rocklatan, stop cosopt OS to see if it helps improve dermatitis  shield and activity restrictions.  RTC 1 week, sooner prn  (may need to lift eyelid so patient can see)

## 2025-04-29 ENCOUNTER — APPOINTMENT (OUTPATIENT)
Dept: OPHTHALMOLOGY | Facility: CLINIC | Age: 80
End: 2025-04-29
Payer: MEDICARE

## 2025-05-02 ENCOUNTER — APPOINTMENT (OUTPATIENT)
Dept: OPHTHALMOLOGY | Facility: CLINIC | Age: 80
End: 2025-05-02
Payer: MEDICARE

## 2025-05-02 DIAGNOSIS — Z96.1 PSEUDOPHAKIA: Primary | ICD-10-CM

## 2025-05-02 PROCEDURE — 99024 POSTOP FOLLOW-UP VISIT: CPT | Performed by: OPHTHALMOLOGY

## 2025-05-02 ASSESSMENT — CUP TO DISC RATIO
OS_RATIO: 0.9
OD_RATIO: 0.9

## 2025-05-02 ASSESSMENT — SLIT LAMP EXAM - LIDS
COMMENTS: GOOD POSITION
COMMENTS: GOOD POSITION

## 2025-05-02 ASSESSMENT — VISUAL ACUITY
METHOD: SNELLEN - LINEAR
OS_PH_SC: 20/40-
OS_SC: 20/50-2

## 2025-05-02 ASSESSMENT — PACHYMETRY
OD_CT(UM): 501
OS_CT(UM): 502

## 2025-05-02 ASSESSMENT — TONOMETRY
OS_IOP_MMHG: 11
IOP_METHOD: GOLDMANN APPLANATION

## 2025-05-02 ASSESSMENT — EXTERNAL EXAM - LEFT EYE: OS_EXAM: DERMATITIS RESOLVED

## 2025-05-02 NOTE — PROGRESS NOTES
chronic angle closure glaucoma, both eyes:  Patient has again been lost to follow up now with marked loss of vision  Unclear if glaucoma has snuffed, retina is worse or cataracts is cause of decline although given the view to back is still intact concern is that he snuffed   tmax 28/27   gonio with pas OU   thin pachs   oct rnfl with thinning   severe cupping on exam   uncontrolled IOP   extensive family hx of glaucoma and blindness  Continue  rocklatan qhs and cosopt BID OU   will proceed with phaco/migs, patient understands potential need for filtering surgery down the road      cme, right eye  Now chronic appearing  Patietn refused injection with Dr. Ingram and Dr. Conway  Will redirect to retina after cataracts are out depending on visual potential    Ariel Mayorga 78 y.o. presents with visually significant cataract of both eye(s). The cataract(s) is/are affecting activities of daily living including reading, watching tv, and driving. It is believed removal of the cataract will improve vision and thus quality of life. After discussing r/b/a to surgery the patient elected to proceedleft eye first then right. Lens options were discussed including pros/cons/and eligibility for monofocal, toric, multifocal, and toric multifocal lenses and patient elected to proceed with monofocal. patient's current mrx is hyperopic. target after surgery will be plano. patient has hx of chronic CME OD, severe chronic angle closure glaucoma that my effect surgery or post surgical visual outcome. The patient was told to continue all medications through surgery.  anesthesia will planned to be mac with topical. Will combine with abic. Will use Ks from sarahi OS      POw1 s/p ce/iol/abic, left   doing well.   taper pred  Stop moxi QID  Continue rocklatan, stop cosopt OS  discontinue shield and activity restrictions.  Proceed with OD

## 2025-05-21 DIAGNOSIS — Z98.41 CATARACT EXTRACTION STATUS OF RIGHT EYE: Primary | ICD-10-CM

## 2025-05-21 PROCEDURE — RXMED WILLOW AMBULATORY MEDICATION CHARGE

## 2025-05-21 RX ORDER — MOXIFLOXACIN 5 MG/ML
1 SOLUTION/ DROPS OPHTHALMIC 4 TIMES DAILY
Qty: 3 ML | Refills: 0 | Status: SHIPPED | OUTPATIENT
Start: 2025-05-21

## 2025-05-21 RX ORDER — PREDNISOLONE ACETATE 10 MG/ML
1 SUSPENSION/ DROPS OPHTHALMIC 4 TIMES DAILY
Qty: 5 ML | Refills: 0 | Status: SHIPPED | OUTPATIENT
Start: 2025-05-21

## 2025-05-22 ENCOUNTER — ANESTHESIA EVENT (OUTPATIENT)
Dept: OPERATING ROOM | Facility: CLINIC | Age: 80
End: 2025-05-22
Payer: MEDICARE

## 2025-05-22 ENCOUNTER — HOSPITAL ENCOUNTER (OUTPATIENT)
Facility: CLINIC | Age: 80
Setting detail: OUTPATIENT SURGERY
Discharge: HOME | End: 2025-05-22
Attending: OPHTHALMOLOGY | Admitting: OPHTHALMOLOGY
Payer: MEDICARE

## 2025-05-22 ENCOUNTER — ANESTHESIA (OUTPATIENT)
Dept: OPERATING ROOM | Facility: CLINIC | Age: 80
End: 2025-05-22
Payer: MEDICARE

## 2025-05-22 ENCOUNTER — PHARMACY VISIT (OUTPATIENT)
Dept: PHARMACY | Facility: CLINIC | Age: 80
End: 2025-05-22
Payer: MEDICARE

## 2025-05-22 VITALS
OXYGEN SATURATION: 98 % | RESPIRATION RATE: 16 BRPM | TEMPERATURE: 97.3 F | HEIGHT: 70 IN | BODY MASS INDEX: 21.75 KG/M2 | SYSTOLIC BLOOD PRESSURE: 151 MMHG | HEART RATE: 63 BPM | WEIGHT: 151.9 LBS | DIASTOLIC BLOOD PRESSURE: 69 MMHG

## 2025-05-22 DIAGNOSIS — H25.13 AGE-RELATED NUCLEAR CATARACT OF BOTH EYES: ICD-10-CM

## 2025-05-22 DIAGNOSIS — H40.2231 CHRONIC ANGLE-CLOSURE GLAUCOMA OF BOTH EYES, MILD STAGE: Primary | ICD-10-CM

## 2025-05-22 PROBLEM — H26.9 CATARACT PRESENT: Status: ACTIVE | Noted: 2025-05-22

## 2025-05-22 PROBLEM — H54.7 VISION LOSS: Status: ACTIVE | Noted: 2025-05-22

## 2025-05-22 PROCEDURE — 3700000002 HC GENERAL ANESTHESIA TIME - EACH INCREMENTAL 1 MINUTE: Performed by: OPHTHALMOLOGY

## 2025-05-22 PROCEDURE — 2720000007 HC OR 272 NO HCPCS: Performed by: OPHTHALMOLOGY

## 2025-05-22 PROCEDURE — 66984 XCAPSL CTRC RMVL W/O ECP: CPT | Performed by: OPHTHALMOLOGY

## 2025-05-22 PROCEDURE — 2500000004 HC RX 250 GENERAL PHARMACY W/ HCPCS (ALT 636 FOR OP/ED): Performed by: OPHTHALMOLOGY

## 2025-05-22 PROCEDURE — V2632 POST CHMBR INTRAOCULAR LENS: HCPCS | Performed by: OPHTHALMOLOGY

## 2025-05-22 PROCEDURE — 2500000004 HC RX 250 GENERAL PHARMACY W/ HCPCS (ALT 636 FOR OP/ED)

## 2025-05-22 PROCEDURE — 3600000008 HC OR TIME - EACH INCREMENTAL 1 MINUTE - PROCEDURE LEVEL THREE: Performed by: OPHTHALMOLOGY

## 2025-05-22 PROCEDURE — A65820 PR RELIEVE INNER EYE PRESSURE

## 2025-05-22 PROCEDURE — 3700000001 HC GENERAL ANESTHESIA TIME - INITIAL BASE CHARGE: Performed by: OPHTHALMOLOGY

## 2025-05-22 PROCEDURE — 2500000005 HC RX 250 GENERAL PHARMACY W/O HCPCS: Performed by: OPHTHALMOLOGY

## 2025-05-22 PROCEDURE — 2500000005 HC RX 250 GENERAL PHARMACY W/O HCPCS: Performed by: STUDENT IN AN ORGANIZED HEALTH CARE EDUCATION/TRAINING PROGRAM

## 2025-05-22 PROCEDURE — A65820 PR RELIEVE INNER EYE PRESSURE: Performed by: ANESTHESIOLOGY

## 2025-05-22 PROCEDURE — 7100000009 HC PHASE TWO TIME - INITIAL BASE CHARGE: Performed by: OPHTHALMOLOGY

## 2025-05-22 PROCEDURE — C1887 CATHETER, GUIDING: HCPCS | Performed by: OPHTHALMOLOGY

## 2025-05-22 PROCEDURE — 99100 ANES PT EXTEME AGE<1 YR&>70: CPT | Performed by: ANESTHESIOLOGY

## 2025-05-22 PROCEDURE — 7100000010 HC PHASE TWO TIME - EACH INCREMENTAL 1 MINUTE: Performed by: OPHTHALMOLOGY

## 2025-05-22 PROCEDURE — 2760000001 HC OR 276 NO HCPCS: Performed by: OPHTHALMOLOGY

## 2025-05-22 PROCEDURE — 3600000003 HC OR TIME - INITIAL BASE CHARGE - PROCEDURE LEVEL THREE: Performed by: OPHTHALMOLOGY

## 2025-05-22 PROCEDURE — 2500000005 HC RX 250 GENERAL PHARMACY W/O HCPCS

## 2025-05-22 PROCEDURE — 65820 GONIOTOMY: CPT | Performed by: OPHTHALMOLOGY

## 2025-05-22 DEVICE — CANALOPLASTY MICROCATHETER KITKIT CONTENTS:(1) CANALOPLASTY MICROCATHETER ITRACK 250A(1) OPTHALMIC VISCOINJECTOR
Type: IMPLANTABLE DEVICE | Site: EYE | Status: FUNCTIONAL
Brand: ITRACK 250A

## 2025-05-22 DEVICE — CLAREON ASPHERIC HYDROPHOBIC ACRYLIC IOL WITH THE AUTONOMEAUTOMATED PRE-LOADED DELIVERY SYSTEM
Type: IMPLANTABLE DEVICE | Site: EYE | Status: FUNCTIONAL
Brand: CLAREON™

## 2025-05-22 RX ORDER — MOXIFLOXACIN 5 MG/ML
1 SOLUTION/ DROPS OPHTHALMIC
Status: SHIPPED | OUTPATIENT
Start: 2025-05-22 | End: 2025-05-22

## 2025-05-22 RX ORDER — MOXIFLOXACIN 5 MG/ML
1 SOLUTION/ DROPS OPHTHALMIC
Status: COMPLETED | OUTPATIENT
Start: 2025-05-22 | End: 2025-05-22

## 2025-05-22 RX ORDER — TROPICAMIDE 10 MG/ML
1 SOLUTION/ DROPS OPHTHALMIC
Status: SHIPPED | OUTPATIENT
Start: 2025-05-22 | End: 2025-05-22

## 2025-05-22 RX ORDER — POVIDONE-IODINE 5 %
SOLUTION, NON-ORAL OPHTHALMIC (EYE) AS NEEDED
Status: DISCONTINUED | OUTPATIENT
Start: 2025-05-22 | End: 2025-05-22 | Stop reason: HOSPADM

## 2025-05-22 RX ORDER — TETRACAINE HYDROCHLORIDE 5 MG/ML
1 SOLUTION OPHTHALMIC ONCE
Status: COMPLETED | OUTPATIENT
Start: 2025-05-22 | End: 2025-05-22

## 2025-05-22 RX ORDER — LIDOCAINE HYDROCHLORIDE 10 MG/ML
0.1 INJECTION, SOLUTION EPIDURAL; INFILTRATION; INTRACAUDAL; PERINEURAL ONCE
Status: CANCELLED | OUTPATIENT
Start: 2025-05-22 | End: 2025-05-22

## 2025-05-22 RX ORDER — CYCLOPENTOLATE HYDROCHLORIDE 10 MG/ML
1 SOLUTION/ DROPS OPHTHALMIC
Status: SHIPPED | OUTPATIENT
Start: 2025-05-22 | End: 2025-05-22

## 2025-05-22 RX ORDER — PHENYLEPHRINE HYDROCHLORIDE 25 MG/ML
1 SOLUTION/ DROPS OPHTHALMIC
Status: COMPLETED | OUTPATIENT
Start: 2025-05-22 | End: 2025-05-22

## 2025-05-22 RX ORDER — DICLOFENAC SODIUM 1 MG/ML
1 SOLUTION/ DROPS OPHTHALMIC
Status: COMPLETED | OUTPATIENT
Start: 2025-05-22 | End: 2025-05-22

## 2025-05-22 RX ORDER — ONDANSETRON HYDROCHLORIDE 2 MG/ML
4 INJECTION, SOLUTION INTRAVENOUS ONCE AS NEEDED
Status: CANCELLED | OUTPATIENT
Start: 2025-05-22

## 2025-05-22 RX ORDER — FENTANYL CITRATE 50 UG/ML
INJECTION, SOLUTION INTRAMUSCULAR; INTRAVENOUS AS NEEDED
Status: DISCONTINUED | OUTPATIENT
Start: 2025-05-22 | End: 2025-05-22

## 2025-05-22 RX ORDER — TROPICAMIDE 10 MG/ML
1 SOLUTION/ DROPS OPHTHALMIC
Status: COMPLETED | OUTPATIENT
Start: 2025-05-22 | End: 2025-05-22

## 2025-05-22 RX ORDER — MOXIFLOXACIN 5 MG/ML
SOLUTION/ DROPS OPHTHALMIC AS NEEDED
Status: DISCONTINUED | OUTPATIENT
Start: 2025-05-22 | End: 2025-05-22 | Stop reason: HOSPADM

## 2025-05-22 RX ORDER — DICLOFENAC SODIUM 1 MG/ML
1 SOLUTION/ DROPS OPHTHALMIC
Status: SHIPPED | OUTPATIENT
Start: 2025-05-22 | End: 2025-05-22

## 2025-05-22 RX ORDER — SODIUM CHLORIDE, SODIUM LACTATE, POTASSIUM CHLORIDE, CALCIUM CHLORIDE 600; 310; 30; 20 MG/100ML; MG/100ML; MG/100ML; MG/100ML
50 INJECTION, SOLUTION INTRAVENOUS CONTINUOUS
Status: CANCELLED | OUTPATIENT
Start: 2025-05-22 | End: 2025-05-22

## 2025-05-22 RX ORDER — TETRACAINE HYDROCHLORIDE 5 MG/ML
SOLUTION OPHTHALMIC AS NEEDED
Status: DISCONTINUED | OUTPATIENT
Start: 2025-05-22 | End: 2025-05-22 | Stop reason: HOSPADM

## 2025-05-22 RX ORDER — PREDNISOLONE ACETATE 10 MG/ML
SUSPENSION/ DROPS OPHTHALMIC AS NEEDED
Status: DISCONTINUED | OUTPATIENT
Start: 2025-05-22 | End: 2025-05-22 | Stop reason: HOSPADM

## 2025-05-22 RX ORDER — MIDAZOLAM HYDROCHLORIDE 1 MG/ML
INJECTION, SOLUTION INTRAMUSCULAR; INTRAVENOUS AS NEEDED
Status: DISCONTINUED | OUTPATIENT
Start: 2025-05-22 | End: 2025-05-22

## 2025-05-22 RX ORDER — CYCLOPENTOLATE HYDROCHLORIDE 10 MG/ML
1 SOLUTION/ DROPS OPHTHALMIC
Status: COMPLETED | OUTPATIENT
Start: 2025-05-22 | End: 2025-05-22

## 2025-05-22 RX ORDER — LIDOCAINE HYDROCHLORIDE 20 MG/ML
INJECTION, SOLUTION INFILTRATION; PERINEURAL AS NEEDED
Status: DISCONTINUED | OUTPATIENT
Start: 2025-05-22 | End: 2025-05-22 | Stop reason: HOSPADM

## 2025-05-22 RX ORDER — EPINEPHRINE 1 MG/ML
INJECTION, SOLUTION, CONCENTRATE INTRAVENOUS AS NEEDED
Status: DISCONTINUED | OUTPATIENT
Start: 2025-05-22 | End: 2025-05-22 | Stop reason: HOSPADM

## 2025-05-22 RX ORDER — PHENYLEPHRINE HYDROCHLORIDE 25 MG/ML
1 SOLUTION/ DROPS OPHTHALMIC
Status: SHIPPED | OUTPATIENT
Start: 2025-05-22 | End: 2025-05-22

## 2025-05-22 RX ADMIN — MOXIFLOXACIN HYDROCHLORIDE 1 DROP: 5 SOLUTION/ DROPS OPHTHALMIC at 08:25

## 2025-05-22 RX ADMIN — MIDAZOLAM 2 MG: 1 INJECTION INTRAMUSCULAR; INTRAVENOUS at 09:37

## 2025-05-22 RX ADMIN — DICLOFENAC SODIUM 1 DROP: 1 SOLUTION/ DROPS OPHTHALMIC at 08:25

## 2025-05-22 RX ADMIN — TETRACAINE HYDROCHLORIDE 1 DROP: 5 SOLUTION OPHTHALMIC at 08:15

## 2025-05-22 RX ADMIN — PHENYLEPHRINE HYDROCHLORIDE 1 DROP: 25 SOLUTION/ DROPS OPHTHALMIC at 08:20

## 2025-05-22 RX ADMIN — FENTANYL CITRATE 50 MCG: 50 INJECTION, SOLUTION INTRAMUSCULAR; INTRAVENOUS at 10:03

## 2025-05-22 RX ADMIN — DICLOFENAC SODIUM 1 DROP: 1 SOLUTION/ DROPS OPHTHALMIC at 08:15

## 2025-05-22 RX ADMIN — CYCLOPENTOLATE HYDROCHLORIDE 1 DROP: 10 SOLUTION/ DROPS OPHTHALMIC at 08:25

## 2025-05-22 RX ADMIN — PHENYLEPHRINE HYDROCHLORIDE 1 DROP: 25 SOLUTION/ DROPS OPHTHALMIC at 08:25

## 2025-05-22 RX ADMIN — FENTANYL CITRATE 50 MCG: 50 INJECTION, SOLUTION INTRAMUSCULAR; INTRAVENOUS at 09:59

## 2025-05-22 RX ADMIN — DICLOFENAC SODIUM 1 DROP: 1 SOLUTION/ DROPS OPHTHALMIC at 08:20

## 2025-05-22 RX ADMIN — MOXIFLOXACIN HYDROCHLORIDE 1 DROP: 5 SOLUTION/ DROPS OPHTHALMIC at 08:15

## 2025-05-22 RX ADMIN — CYCLOPENTOLATE HYDROCHLORIDE 1 DROP: 10 SOLUTION/ DROPS OPHTHALMIC at 08:15

## 2025-05-22 RX ADMIN — TROPICAMIDE 1 DROP: 10 SOLUTION/ DROPS OPHTHALMIC at 08:15

## 2025-05-22 RX ADMIN — TROPICAMIDE 1 DROP: 10 SOLUTION/ DROPS OPHTHALMIC at 08:25

## 2025-05-22 RX ADMIN — TROPICAMIDE 1 DROP: 10 SOLUTION/ DROPS OPHTHALMIC at 08:20

## 2025-05-22 RX ADMIN — CYCLOPENTOLATE HYDROCHLORIDE 1 DROP: 10 SOLUTION/ DROPS OPHTHALMIC at 08:20

## 2025-05-22 RX ADMIN — PHENYLEPHRINE HYDROCHLORIDE 1 DROP: 25 SOLUTION/ DROPS OPHTHALMIC at 08:15

## 2025-05-22 RX ADMIN — MOXIFLOXACIN HYDROCHLORIDE 1 DROP: 5 SOLUTION/ DROPS OPHTHALMIC at 08:20

## 2025-05-22 ASSESSMENT — PAIN SCALES - GENERAL
PAINLEVEL_OUTOF10: 0 - NO PAIN

## 2025-05-22 ASSESSMENT — COLUMBIA-SUICIDE SEVERITY RATING SCALE - C-SSRS
2. HAVE YOU ACTUALLY HAD ANY THOUGHTS OF KILLING YOURSELF?: NO
6. HAVE YOU EVER DONE ANYTHING, STARTED TO DO ANYTHING, OR PREPARED TO DO ANYTHING TO END YOUR LIFE?: NO
1. IN THE PAST MONTH, HAVE YOU WISHED YOU WERE DEAD OR WISHED YOU COULD GO TO SLEEP AND NOT WAKE UP?: NO

## 2025-05-22 ASSESSMENT — PAIN - FUNCTIONAL ASSESSMENT
PAIN_FUNCTIONAL_ASSESSMENT: 0-10

## 2025-05-22 NOTE — ANESTHESIA PREPROCEDURE EVALUATION
Patient: Ariel Mayorga    Procedure Information       Date/Time: 05/22/25 0913    Procedures:       Extraction Cataract with Insertion Intraocular Lens (Left)      Goniotomy (Left)    [facility]    Surgeons: Jacqueline Richardson MD            Relevant Problems   Anesthesia (within normal limits)      Cardiac (within normal limits)      Pulmonary (within normal limits)      Neuro (within normal limits)      GI (within normal limits)      /Renal   (+) Adenocarcinoma of prostate (Multi)      Liver (within normal limits)      Endocrine (within normal limits)      Hematology (within normal limits)      Musculoskeletal   (+) Rheumatoid arthritis      HEENT   (+) Cataract present   (+) Chronic angle-closure glaucoma of both eyes, mild stage   (+) Glaucoma   (+) Vision loss      Skin (within normal limits)       Clinical information reviewed:   Tobacco  Allergies  Meds   Med Hx  Surg Hx   Fam Hx  Soc Hx        NPO Detail:  NPO/Void Status  Carbohydrate Drink Given Prior to Surgery? : N  Date of Last Liquid: 05/21/25  Time of Last Liquid: 2315  Date of Last Solid: 05/21/25  Time of Last Solid: 1930  Last Intake Type: Clear fluids  Time of Last Void: 0709         Physical Exam    Airway  Mallampati: III  Mouth opening: 3 or more finger widths     Cardiovascular - normal exam   Dental     (+) upper dentures, lower dentures     Pulmonary - normal exam   Abdominal - normal exam           Anesthesia Plan    History of general anesthesia?: yes  History of complications of general anesthesia?: no    ASA 3     MAC     Anesthetic plan and risks discussed with patient.    Plan discussed with resident and attending.

## 2025-05-22 NOTE — DISCHARGE INSTRUCTIONS
Surgeon: Jacqueline Richardson MD    Patient name: Ariel Mayorga    Date of visit:May 22, 2025      right cataract surgery post op instructions    Drops after surgery:  Moxifloxacin (tan)  Prednisolone (white/pink)      Start these drops at 6 pm tonight and use them again before bed  Starting day after surgery use drop 4x a day (breakfast, lunch, dinner, bedtime)  Wait 5 minutes in between drops    Bring all drops with you to your appointment tomorrow    No heavy lifting over 10-15 lbs, no bending over, do not get eye wet, no eye rubbing. Wear eye shield at anytime you are sleeping. Shower from neck down only. Please call immediately if you develop worsening redness, pain, decreased vision, flashes, floaters.       During regular business hours call: [phone] ([phone]), choose option for [facility], and Dr. Richardson [facility]  After hours call hospital : [phone], ask to speak with on-call ophthalmology resident

## 2025-05-22 NOTE — H&P
"History Of Present Illness  Ariel Mayorga is a 79 y.o. male presenting with blurry vision right eye here for planned Cataract extraction with intraocular lens (IOL) implantation right eye with canaloplasty.     Past Medical History  Medical History[1]    Surgical History  Surgical History[2]     Social History  He reports that he has never smoked. He has never been exposed to tobacco smoke. He has never used smokeless tobacco. He reports current alcohol use. He reports that he does not use drugs.    Family History  Family History[3]     Allergies  Patient has no known allergies.    Review of Systems   All other systems reviewed and are negative.       Physical Exam   Physical exam:  Head: normocephalic  Eyes: see clinic note  Respiratory: per anesthesia  Cardiovascular: per anesthesia  Neuro: alert and interactive       Last Recorded Vitals  Blood pressure (!) 188/87, pulse 70, temperature 36.2 °C (97.2 °F), resp. rate 16, height 1.778 m (5' 10\"), weight 68.9 kg (151 lb 14.4 oz), SpO2 99%.       Assessment & Plan  Vision loss    Cataract present    Chronic angle-closure glaucoma of both eyes, mild stage    Age-related nuclear cataract of both eyes      Ariel Mayorga is a 79 y.o. male presenting with blurry vision right eye here for planned Cataract extraction with intraocular lens (IOL) implantation right eye with canaloplasty.       Proceed w plan as above        Greta Chavira MD         [1]   Past Medical History:  Diagnosis Date    Cataract     Cellulitis of face 10/09/2014    Facial cellulitis    Encounter for immunization 10/16/2015    Flu vaccine need    Encounter for screening for malignant neoplasm of prostate 03/03/2015    Encounter for prostate cancer screening    Glaucoma     Other conditions influencing health status     Reported Prostate Antigen Blood Test    Personal history of other diseases of the nervous system and sense organs     History of glaucoma    Personal history of other diseases of the " nervous system and sense organs     History of cataract    Personal history of other specified conditions 03/03/2015    History of abdominal pain   [2]   Past Surgical History:  Procedure Laterality Date    CATARACT EXTRACTION      COLONOSCOPY  12/03/2013    Complete Colonoscopy    HERNIA REPAIR      2024    OTHER SURGICAL HISTORY  07/29/2013    Prostatectomy Robotic-Assisted    PROSTATECTOMY  12/03/2013    Prostatectomy Radical   [3]   Family History  Problem Relation Name Age of Onset    Glaucoma Mother      Macular degeneration Sister      Glaucoma Sister      Glaucoma Brother

## 2025-05-22 NOTE — ANESTHESIA POSTPROCEDURE EVALUATION
Patient: Ariel Mayorga    Procedure Summary       Date: 05/22/25 Room / Location: [facility] OR 03 / [facility]    Anesthesia Start: 0934 Anesthesia Stop: 1016    Procedures:       Extraction Cataract with Insertion Intraocular Lens (Left)      Goniotomy (Left) Diagnosis:       Chronic angle-closure glaucoma of both eyes, mild stage      Age-related nuclear cataract of both eyes      (Chronic angle-closure glaucoma of both eyes, mild stage [H40.2231])      (Age-related nuclear cataract of both eyes [H25.13])    Surgeons: Jacqueline Richardson MD Responsible Provider: Kuldeep Camacho MD    Anesthesia Type: MAC ASA Status: 3            Anesthesia Type: MAC    Vitals Value Taken Time   /87 05/22/25 10:16   Temp 36.0 05/22/25 10:16   Pulse 61 05/22/25 10:16   Resp 16 05/22/25 10:16   SpO2 99 05/22/25 10:16       Anesthesia Post Evaluation    Patient location during evaluation: PACU  Patient participation: complete - patient participated  Level of consciousness: sleepy but conscious  Pain management: adequate  Airway patency: patent  Cardiovascular status: hemodynamically stable and acceptable  Respiratory status: spontaneous ventilation and room air  Hydration status: acceptable  Postoperative Nausea and Vomiting: none        There were no known notable events for this encounter.

## 2025-05-22 NOTE — OP NOTE
Operative Note     Date: 2025  OR Location: [facility]    Name: Ariel Mayorga, : 1945, Age: 79 y.o., MRN: [id number], Sex: male    Diagnosis  Pre-op Diagnosis      * Chronic angle-closure glaucoma of both eyes, mild stage [H40.2231]     * Age-related nuclear cataract of both eyes [H25.13] Post-op Diagnosis     * Chronic angle-closure glaucoma of both eyes, mild stage [H40.2231]     * Age-related nuclear cataract of both eyes [H25.13]     Procedures  Extraction Cataract with Insertion Intraocular Lens  67649 - RI XCAPSL CTRC RMVL INSJ IO LENS PROSTH W/O ECP    Goniotomy  09467 - RI GONIOTOMY  , RIGHT    Surgeons   Panel 1:     * Jacqueline Richardson - Primary  Panel 2:     * Jacqueline Richardson - Primary    Resident/Fellow/Other Assistant:  Fan    Procedure Summary  Anesthesia: Monitor Anesthesia Care  ASA: III  Anesthesia Staff:   Anesthesiologist: Kuldeep Camacho MD  C-AA: GRACE Sheth  Estimated Blood Loss: none        Specimen: No specimens collected     Staff:   Circulator: Sanjuana Shore RN  Scrub Person: Daryl Mancuso; Kathy Kyle          Findings: as expected    Indications: Ariel Mayorga is an 79 y.o. male who is having surgery for Chronic angle-closure glaucoma of both eyes, mild stage [H40.2231]  Age-related nuclear cataract of both eyes [H25.13]. RIGHT    The patient was seen in the preoperative area. The risks, benefits, complications, treatment options, non-operative alternatives, expected recovery and outcomes were discussed with the patient. The possibilities of reaction to medication, pulmonary aspiration, injury to surrounding structures, bleeding, recurrent infection, the need for additional procedures, failure to diagnose a condition, and creating a complication or operation were discussed with the patient. The patient concurred with the proposed plan, giving informed consent.  The site of surgery was properly noted/marked if necessary per policy.     Procedure Details:   The  patient was escorted to the operating room where a time out was completed   and monitored anesthesia care was begun. The patient was prepped and draped in   the usual sterile fashion for intraocular surgery. A lid speculum was placed   and the operating microscope was positioned. A paracentesis was made to the   left of the planned cataract incision with a 1mm blade.  Shugarcaine followed by Viscoat was used to   replace the aqueous humor. A temporal clear corneal wound was made with a 2.4   mm keratome, extending 2 mm into clear cornea before entering the anterior   chamber. A continuous curvilinear  capsulorhexis of approximately 5 mm in   diameter was performed. Hydrodissection was performed using a canula. The   pre-chopper was used to crack the nucleus into smaller pieces which were then   removed with the assistance of a horizontal chopper and phaco hand piece.   Residual cortex was removed with IA. ProVisc was used to inflate the capsular   bag. The lens implant  lyubov CNAUTO 24.0 diopter intraocular lens. The lens was   inserted into the capsular bag and rotated to the proper position with a   gerson. The patient was then positioned for direct gonioscopy. A hour 3 clock hour nasal goniotomy was made.   The microcatheter was primed and then thread for 360 degrees and then retracted whilst   injecting viscoelastic approximately 8-10 clock hours per quadrant. the device   was then removed and the patietn was repositioned supine. Residual   viscoelastic was removed from the eye with the irrigation/aspiration   instrument. The wounds were checked and found to be watertight. The anterior   chamber was at physiologic depth and pressure. The lid speculum was removed and   a patch and shield were taped in place. The patient tolerated the procedure   well, and there were no complications.    Complications:  None   Disposition: stable          Attending Attestation: I was present and scrubbed for the entire  procedure    Jacqueline Richardson  Phone Number: [phone]

## 2025-05-23 ENCOUNTER — APPOINTMENT (OUTPATIENT)
Dept: OPHTHALMOLOGY | Facility: CLINIC | Age: 80
End: 2025-05-23
Payer: MEDICARE

## 2025-05-23 DIAGNOSIS — Z96.1 PSEUDOPHAKIA: Primary | ICD-10-CM

## 2025-05-23 PROCEDURE — 99024 POSTOP FOLLOW-UP VISIT: CPT | Performed by: OPHTHALMOLOGY

## 2025-05-23 ASSESSMENT — ENCOUNTER SYMPTOMS
ALLERGIC/IMMUNOLOGIC NEGATIVE: 0
CONSTITUTIONAL NEGATIVE: 0
HEMATOLOGIC/LYMPHATIC NEGATIVE: 0
RESPIRATORY NEGATIVE: 0
NEUROLOGICAL NEGATIVE: 0
MUSCULOSKELETAL NEGATIVE: 0
ENDOCRINE NEGATIVE: 0
GASTROINTESTINAL NEGATIVE: 0
PSYCHIATRIC NEGATIVE: 0
EYES NEGATIVE: 1
CARDIOVASCULAR NEGATIVE: 0

## 2025-05-23 ASSESSMENT — PAIN SCALES - GENERAL: PAINLEVEL_OUTOF10: 0 - NO PAIN

## 2025-05-23 ASSESSMENT — TONOMETRY
OS_IOP_MMHG: 13
IOP_METHOD: GOLDMANN APPLANATION

## 2025-05-23 ASSESSMENT — VISUAL ACUITY
OD_SC: 20/1000
OS_SC: 20/40
METHOD: SNELLEN - LINEAR

## 2025-05-23 ASSESSMENT — PACHYMETRY
OD_CT(UM): 501
OS_CT(UM): 502

## 2025-05-23 ASSESSMENT — EXTERNAL EXAM - LEFT EYE: OS_EXAM: DERMATITIS RESOLVED

## 2025-05-23 ASSESSMENT — SLIT LAMP EXAM - LIDS
COMMENTS: GOOD POSITION
COMMENTS: NORMAL

## 2025-05-23 NOTE — PROGRESS NOTES
chronic angle closure glaucoma, both eyes:  Patient has again been lost to follow up now with marked loss of vision  Unclear if glaucoma has snuffed, retina is worse or cataracts is cause of decline although given the view to back is still intact concern is that he snuffed   tmax 28/27   gonio with pas OU   thin pachs   oct rnfl with thinning   severe cupping on exam   uncontrolled IOP   extensive family hx of glaucoma and blindness  Continue  rocklatan qhs and cosopt BID OU   will proceed with phaco/migs, patient understands potential need for filtering surgery down the road      cme, right eye  Now chronic appearing  Patietn refused injection with Dr. Ingram and Dr. Conway  Will redirect to retina after cataracts are out depending on visual potential    Ariel Mayorga 78 y.o. presents with visually significant cataract of both eye(s). The cataract(s) is/are affecting activities of daily living including reading, watching tv, and driving. It is believed removal of the cataract will improve vision and thus quality of life. After discussing r/b/a to surgery the patient elected to proceedleft eye first then right. Lens options were discussed including pros/cons/and eligibility for monofocal, toric, multifocal, and toric multifocal lenses and patient elected to proceed with monofocal. patient's current mrx is hyperopic. target after surgery will be plano. patient has hx of chronic CME OD, severe chronic angle closure glaucoma that my effect surgery or post surgical visual outcome. The patient was told to continue all medications through surgery.  anesthesia will planned to be mac with topical. Will combine with abic. Will use Ks from sarahi OS      POD1 s/p ce/iol/abic, right  doing well.   Continue pred/moxi QID  Stop all glaucoma drops OU  improve dermatitis  shield and activity restrictions.  RTC 1 week, sooner prn  (may need to lift eyelid so patient can see)

## 2025-05-30 ENCOUNTER — APPOINTMENT (OUTPATIENT)
Dept: OPHTHALMOLOGY | Facility: CLINIC | Age: 80
End: 2025-05-30
Payer: MEDICARE

## 2025-05-30 DIAGNOSIS — Z96.1 PSEUDOPHAKIA: Primary | ICD-10-CM

## 2025-05-30 PROCEDURE — 99024 POSTOP FOLLOW-UP VISIT: CPT | Performed by: OPHTHALMOLOGY

## 2025-05-30 ASSESSMENT — TONOMETRY
OD_IOP_MMHG: 10
OS_IOP_MMHG: 16
IOP_UNABLETOASSESS: 1
IOP_METHOD: GOLDMANN APPLANATION

## 2025-05-30 ASSESSMENT — VISUAL ACUITY
OS_CC: 20/30
OD_CC: 20/400
METHOD: SNELLEN - LINEAR

## 2025-05-30 ASSESSMENT — ENCOUNTER SYMPTOMS
MUSCULOSKELETAL NEGATIVE: 0
EYES NEGATIVE: 1
NEUROLOGICAL NEGATIVE: 0
HEMATOLOGIC/LYMPHATIC NEGATIVE: 0
CARDIOVASCULAR NEGATIVE: 0
GASTROINTESTINAL NEGATIVE: 0
RESPIRATORY NEGATIVE: 0
ALLERGIC/IMMUNOLOGIC NEGATIVE: 0
ENDOCRINE NEGATIVE: 0
PSYCHIATRIC NEGATIVE: 0
CONSTITUTIONAL NEGATIVE: 0

## 2025-05-30 ASSESSMENT — PACHYMETRY
OD_CT(UM): 501
OS_CT(UM): 502

## 2025-05-30 ASSESSMENT — SLIT LAMP EXAM - LIDS
COMMENTS: GOOD POSITION
COMMENTS: NORMAL

## 2025-05-30 ASSESSMENT — EXTERNAL EXAM - LEFT EYE: OS_EXAM: DERMATITIS RESOLVED

## 2025-05-30 NOTE — PROGRESS NOTES
chronic angle closure glaucoma, both eyes:  Patient has again been lost to follow up now with marked loss of vision  Unclear if glaucoma has snuffed, retina is worse or cataracts is cause of decline although given the view to back is still intact concern is that he snuffed   tmax 28/27   gonio with pas OU   thin pachs   oct rnfl with thinning   severe cupping on exam   uncontrolled IOP   extensive family hx of glaucoma and blindness  Continue  rocklatan qhs and cosopt BID OU   will proceed with phaco/migs, patient understands potential need for filtering surgery down the road      cme, right eye  Now chronic appearing  Patietn refused injection with Dr. Ingram and Dr. Conway  Will redirect to retina after cataracts are out depending on visual potential    Ariel Mayorga 78 y.o. presents with visually significant cataract of both eye(s). The cataract(s) is/are affecting activities of daily living including reading, watching tv, and driving. It is believed removal of the cataract will improve vision and thus quality of life. After discussing r/b/a to surgery the patient elected to proceedleft eye first then right. Lens options were discussed including pros/cons/and eligibility for monofocal, toric, multifocal, and toric multifocal lenses and patient elected to proceed with monofocal. patient's current mrx is hyperopic. target after surgery will be plano. patient has hx of chronic CME OD, severe chronic angle closure glaucoma that my effect surgery or post surgical visual outcome. The patient was told to continue all medications through surgery.  anesthesia will planned to be mac with topical. Will combine with abic. Will use Ks from sarahi OS      POw1 s/p ce/iol/abic, right  doing well. VA LIMITED BY RETINA  taper pred  Stop moxi QID  Stop all glaucoma drops OU  improve dermatitis  discontinue shield and activity restrictions.  RTC 1 month for mrx/oct mac  (may need to lift eyelid so patient can see)

## 2025-06-20 ENCOUNTER — APPOINTMENT (OUTPATIENT)
Dept: OPHTHALMOLOGY | Facility: CLINIC | Age: 80
End: 2025-06-20
Payer: MEDICARE

## 2025-06-20 DIAGNOSIS — H35.351 CYSTOID MACULAR EDEMA OF RIGHT EYE: Primary | ICD-10-CM

## 2025-06-20 DIAGNOSIS — Z96.1 PSEUDOPHAKIA: ICD-10-CM

## 2025-06-20 ASSESSMENT — REFRACTION_WEARINGRX
OS_AXIS: 160
OD_AXIS: 132
OS_ADD: +2.75
OD_ADD: +2.75
OS_CYLINDER: -1.00
OS_SPHERE: +3.25
OD_CYLINDER: -0.50
OD_SPHERE: +3.00

## 2025-06-20 ASSESSMENT — PACHYMETRY
OS_CT(UM): 502
OD_CT(UM): 501

## 2025-06-20 ASSESSMENT — SLIT LAMP EXAM - LIDS
COMMENTS: NORMAL
COMMENTS: GOOD POSITION

## 2025-06-20 ASSESSMENT — VISUAL ACUITY
OD_SC: 20/300
METHOD: SNELLEN - LINEAR
OS_SC: 20/40

## 2025-06-20 ASSESSMENT — REFRACTION_MANIFEST
OD_SPHERE: +1.00
OD_ADD: +2.75
OD_CYLINDER: -2.50
OS_SPHERE: +1.00
OS_ADD: +2.75
OD_AXIS: 090

## 2025-06-20 ASSESSMENT — TONOMETRY
OD_IOP_MMHG: 19
OS_IOP_MMHG: 15
IOP_METHOD: GOLDMANN APPLANATION

## 2025-06-20 ASSESSMENT — EXTERNAL EXAM - LEFT EYE: OS_EXAM: DERMATITIS RESOLVED

## 2025-06-20 ASSESSMENT — ENCOUNTER SYMPTOMS: EYES NEGATIVE: 1

## 2025-06-20 NOTE — PROGRESS NOTES
chronic angle closure glaucoma, both eyes:  Patient has again been lost to follow up now with marked loss of vision  Unclear if glaucoma has snuffed, retina is worse or cataracts is cause of decline although given the view to back is still intact concern is that he snuffed   tmax 28/27   gonio with pas OU   thin pachs   oct rnfl with thinning   severe cupping on exam   uncontrolled IOP   extensive family hx of glaucoma and blindness  Continue  rocklatan qhs and cosopt BID OU   will proceed with phaco/migs, patient understands potential need for filtering surgery down the road      cme, right eye  Now chronic appearing  Patietn refused injection with Dr. Ingram and Dr. Conway  Will redirect to retina after cataracts are out depending on visual potential    Ariel Mayorga 78 y.o. presents with visually significant cataract of both eye(s). The cataract(s) is/are affecting activities of daily living including reading, watching tv, and driving. It is believed removal of the cataract will improve vision and thus quality of life. After discussing r/b/a to surgery the patient elected to proceedleft eye first then right. Lens options were discussed including pros/cons/and eligibility for monofocal, toric, multifocal, and toric multifocal lenses and patient elected to proceed with monofocal. patient's current mrx is hyperopic. target after surgery will be plano. patient has hx of chronic CME OD, severe chronic angle closure glaucoma that my effect surgery or post surgical visual outcome. The patient was told to continue all medications through surgery.  anesthesia will planned to be mac with topical. Will combine with abic. Will use Ks from sarahi OS      POM1 s/p ce/iol/abic, right  doing well. VA LIMITED BY RETINA  OFF (ALL) DROPS ou  RTC [place] 3-4 months for IOP check  Re-establish with retina for cme

## 2025-07-16 ENCOUNTER — APPOINTMENT (OUTPATIENT)
Dept: OPHTHALMOLOGY | Facility: CLINIC | Age: 80
End: 2025-07-16
Payer: MEDICARE

## 2025-07-16 DIAGNOSIS — H35.351 CYSTOID MACULAR EDEMA OF RIGHT EYE: ICD-10-CM

## 2025-07-16 DIAGNOSIS — H34.8110 CENTRAL RETINAL VEIN OCCLUSION WITH MACULAR EDEMA OF RIGHT EYE: Primary | ICD-10-CM

## 2025-07-16 PROCEDURE — 92134 CPTRZ OPH DX IMG PST SGM RTA: CPT | Performed by: OPHTHALMOLOGY

## 2025-07-16 PROCEDURE — 99213 OFFICE O/P EST LOW 20 MIN: CPT | Performed by: OPHTHALMOLOGY

## 2025-07-16 RX ORDER — VITAMIN B COMPLEX
1 CAPSULE ORAL DAILY
COMMUNITY

## 2025-07-16 ASSESSMENT — CUP TO DISC RATIO
OS_RATIO: 0.9
OD_RATIO: 0.9

## 2025-07-16 ASSESSMENT — TONOMETRY
IOP_METHOD: GOLDMANN APPLANATION
OS_IOP_MMHG: 11
OD_IOP_MMHG: 11

## 2025-07-16 ASSESSMENT — VISUAL ACUITY
OD_SC: 20/400
OS_SC+: +2
OS_SC: 20/40
METHOD: SNELLEN - LINEAR
OD_SC+: +2

## 2025-07-16 ASSESSMENT — ENCOUNTER SYMPTOMS
EYES NEGATIVE: 1
GASTROINTESTINAL NEGATIVE: 0
RESPIRATORY NEGATIVE: 0
ALLERGIC/IMMUNOLOGIC NEGATIVE: 0
CARDIOVASCULAR NEGATIVE: 0
HEMATOLOGIC/LYMPHATIC NEGATIVE: 0
ENDOCRINE NEGATIVE: 0
CONSTITUTIONAL NEGATIVE: 0
PSYCHIATRIC NEGATIVE: 0
MUSCULOSKELETAL NEGATIVE: 0
NEUROLOGICAL NEGATIVE: 0

## 2025-07-16 ASSESSMENT — SLIT LAMP EXAM - LIDS
COMMENTS: GOOD POSITION
COMMENTS: NORMAL

## 2025-07-16 ASSESSMENT — PACHYMETRY
OS_CT(UM): 502
OD_CT(UM): 501

## 2025-07-16 ASSESSMENT — EXTERNAL EXAM - LEFT EYE: OS_EXAM: DERMATITIS RESOLVED

## 2025-07-16 NOTE — PROGRESS NOTES
Cystoid macular edema, right eye   - Chronic appearing macular edema OD, referred from Dr. Richardson  - Patient has refused injections in the past    - Examination shows macular edema and exudates, collaterals on ONH with known CRVO OD per patient recollection  - Given significant macular edema OD, would typically recommend intravitreal injections, but given chronicity of edema, may be of minimal benefit  - Discussed r/b/a with patient regarding intravitreal injections, patient continues to defer for now   - Can continue conversations, especially if noticing vision changes or worsening vision  - Continue management of glaucoma, and other medical comorbidities with Dr. Richardson and PCP  - Monocular precautions     Follow up 4-6 months sooner prn       Chronic angle closure glaucoma, both eyes:  - Continue care with Dr. Richardson

## 2025-09-18 ENCOUNTER — APPOINTMENT (OUTPATIENT)
Dept: OPHTHALMOLOGY | Facility: CLINIC | Age: 80
End: 2025-09-18
Payer: MEDICARE

## 2025-10-02 ENCOUNTER — APPOINTMENT (OUTPATIENT)
Dept: OPHTHALMOLOGY | Facility: CLINIC | Age: 80
End: 2025-10-02
Payer: MEDICARE

## 2025-12-15 ENCOUNTER — APPOINTMENT (OUTPATIENT)
Dept: PRIMARY CARE | Facility: CLINIC | Age: 80
End: 2025-12-15
Payer: MEDICARE

## 2026-01-14 ENCOUNTER — APPOINTMENT (OUTPATIENT)
Dept: OPHTHALMOLOGY | Facility: CLINIC | Age: 81
End: 2026-01-14
Payer: MEDICARE

## (undated) DEVICE — SOLUTION, BSS IRRIGATING 500ML BAG

## (undated) DEVICE — TIP, INTREPID 0.3MM, I/A COAXIAL CONICAL, STRAIGHT

## (undated) DEVICE — Device

## (undated) DEVICE — CANNULA, CHANG HYDRO- DISSECT, 27G, FLAT TIP, DISP

## (undated) DEVICE — COUNTER, NEEDLE, FOAM BLOCK, W/MAGNET, W/BLADE GUARD, 10 COUNT, RED, LF

## (undated) DEVICE — NEEDLE, HYPODERMIC, REGULAR WALL, REGULAR BEVEL, 30 G X 0.5 IN

## (undated) DEVICE — NEEDLE, INSUFFLATION, 14 G, 100 MM

## (undated) DEVICE — CENTURION ACTIVE FMS W/ INTREPID

## (undated) DEVICE — PAD, GROUNDING, ELECTROSURGICAL, W/9 FT CABLE, POLYHESIVE II, ADULT, LF

## (undated) DEVICE — ERASER, HEMOSTATIC, WET-FIELD, BIPOLAR, 18 G

## (undated) DEVICE — SUTURE, PROLENE, 0, 30 IN, CT, BLUE

## (undated) DEVICE — REST, HEAD, BAGEL, 9 IN

## (undated) DEVICE — SOLUTION, BSS IRRIGATING 15ML

## (undated) DEVICE — SUTURE, VICRYL, 0, 27 IN, UR-6, VIOLET

## (undated) DEVICE — COVER, CART, 45 X 27 X 48 IN, CLEAR

## (undated) DEVICE — APPLICATOR, CHLORAPREP, W/ORANGE TINT, 26ML

## (undated) DEVICE — CANNULA, VISTEC, ANTERIOR CHAMBER, RYCROFT, ANGLED, 30 G X 7/8 IN

## (undated) DEVICE — SPEAR, EYE, SURGICAL, WECK-CELL, CELLULOSE

## (undated) DEVICE — CANNULA, ANTERIOR CHAMBER

## (undated) DEVICE — MANIFOLD, 4 PORT NEPTUNE STANDARD

## (undated) DEVICE — GLOVE, SURGICAL, PROTEXIS PI , 6.5, PF, LF

## (undated) DEVICE — CANNULA, 27G X 22MM, ANTERIOR, CHAMBER, RYCROFT

## (undated) DEVICE — BLADE, BEAVER, MINI, 15, STAINLESS STEEL

## (undated) DEVICE — SOLUTION, IRRIGATING, INTRAOCULAR, BSS PLUS, 500ML

## (undated) DEVICE — SUTURE, VICRYL, 7-0, 18 IN, TG1608, DA, VIOLET

## (undated) DEVICE — ABSORBATACK, 5MM, SINGLE USE/W 15 ABSORBABLE TACKS

## (undated) DEVICE — KNIFE, CLEARCUT HP2, DUAL BEVEL, SLIT, 2.4MM ANGLED

## (undated) DEVICE — TOWEL, SURGICAL, NEURO, O/R, 16 X 26, BLUE, STERILE

## (undated) DEVICE — TROCAR, STRUCTURAL BALLOON, SPACEMAKER

## (undated) DEVICE — KNIFE, OPHTHALMIC, CRESCENT, ANGLED, BEVEL UP, SATIN

## (undated) DEVICE — GOWN, ASTOUND, L

## (undated) DEVICE — SUTURE, ETHILON, 10-0, 12 IN, TG160-6, BLACK

## (undated) DEVICE — CAUTERY, PENCIL, PUSH BUTTON, SMOKE EVAC, 70MM

## (undated) DEVICE — SCISSORS, EPIX, LAPOROSCOPIC, 5MM X 35CM

## (undated) DEVICE — CORD, CAUTERY, BIPOLAR, STERILE

## (undated) DEVICE — SUTURE, PROLENE, 0, 30 IN, CT-2, BLUE

## (undated) DEVICE — SUTURE, VICRYL, 4-0, 18 IN, UNDYED BR PS-2

## (undated) DEVICE — ENDO, PORT VERSASTEP 5MM

## (undated) DEVICE — ITRACK ADVANCE ALL-IN-ONE HANDHELD INJECTOR

## (undated) DEVICE — TUBE SET, PNEUMOCLEAR, SMOKE EVACU, HIGH-FLOW

## (undated) DEVICE — SUTURE, VICRYL, 8-0, 12 IN, TG1406, DA, VIOLET

## (undated) DEVICE — SOLUTION, IRRIGATION, USP, STERILE WATER, 250ML, BOTTLE

## (undated) DEVICE — DRAPE, TOWEL, STERI DRAPE, 17 X 11 IN, PLASTIC, STERILE

## (undated) DEVICE — SYRINGE, 1 CC, LUER LOCK

## (undated) DEVICE — DRAPE, UTILITY, INSTRUMENT PANEL, 46CM X 56CM (18X22"